# Patient Record
Sex: FEMALE | Race: WHITE | NOT HISPANIC OR LATINO | URBAN - METROPOLITAN AREA
[De-identification: names, ages, dates, MRNs, and addresses within clinical notes are randomized per-mention and may not be internally consistent; named-entity substitution may affect disease eponyms.]

---

## 2024-01-23 ENCOUNTER — LAB REQUISITION (OUTPATIENT)
Dept: LAB | Facility: HOSPITAL | Age: 84
End: 2024-01-23
Payer: COMMERCIAL

## 2024-01-23 DIAGNOSIS — Z79.01 LONG TERM (CURRENT) USE OF ANTICOAGULANTS: ICD-10-CM

## 2024-01-23 LAB
INR PPP: 3.3 (ref 0.9–1.1)
PROTHROMBIN TIME: 37.9 SECONDS (ref 9.8–12.8)

## 2024-01-23 PROCEDURE — 85610 PROTHROMBIN TIME: CPT

## 2024-01-30 ENCOUNTER — LAB REQUISITION (OUTPATIENT)
Dept: LAB | Facility: HOSPITAL | Age: 84
End: 2024-01-30
Payer: COMMERCIAL

## 2024-01-30 DIAGNOSIS — Z79.01 LONG TERM (CURRENT) USE OF ANTICOAGULANTS: ICD-10-CM

## 2024-01-30 LAB
INR PPP: 2.9 (ref 0.9–1.1)
PROTHROMBIN TIME: 32.6 SECONDS (ref 9.8–12.8)

## 2024-01-30 PROCEDURE — 85610 PROTHROMBIN TIME: CPT

## 2024-02-22 ENCOUNTER — LAB REQUISITION (OUTPATIENT)
Dept: LAB | Facility: HOSPITAL | Age: 84
End: 2024-02-22
Payer: COMMERCIAL

## 2024-02-22 DIAGNOSIS — Z79.01 LONG TERM (CURRENT) USE OF ANTICOAGULANTS: ICD-10-CM

## 2024-02-22 LAB
INR PPP: 3.2 (ref 0.9–1.1)
PROTHROMBIN TIME: 37.1 SECONDS (ref 9.8–12.8)

## 2024-02-22 PROCEDURE — 85610 PROTHROMBIN TIME: CPT

## 2024-02-27 ENCOUNTER — LAB REQUISITION (OUTPATIENT)
Dept: LAB | Facility: HOSPITAL | Age: 84
End: 2024-02-27
Payer: COMMERCIAL

## 2024-02-27 DIAGNOSIS — Z79.01 LONG TERM (CURRENT) USE OF ANTICOAGULANTS: ICD-10-CM

## 2024-02-27 LAB
INR PPP: 2.9 (ref 0.9–1.1)
PROTHROMBIN TIME: 33.1 SECONDS (ref 9.8–12.8)

## 2024-02-27 PROCEDURE — 85610 PROTHROMBIN TIME: CPT

## 2024-03-06 ENCOUNTER — LAB REQUISITION (OUTPATIENT)
Dept: LAB | Facility: HOSPITAL | Age: 84
End: 2024-03-06
Payer: COMMERCIAL

## 2024-03-06 DIAGNOSIS — Z79.01 LONG TERM (CURRENT) USE OF ANTICOAGULANTS: ICD-10-CM

## 2024-03-06 LAB
INR PPP: 4.5 (ref 0.9–1.1)
PROTHROMBIN TIME: 51.8 SECONDS (ref 9.8–12.8)

## 2024-03-06 PROCEDURE — 85610 PROTHROMBIN TIME: CPT

## 2024-03-12 ENCOUNTER — LAB REQUISITION (OUTPATIENT)
Dept: LAB | Facility: HOSPITAL | Age: 84
End: 2024-03-12
Payer: COMMERCIAL

## 2024-03-12 DIAGNOSIS — Z79.01 LONG TERM (CURRENT) USE OF ANTICOAGULANTS: ICD-10-CM

## 2024-03-12 LAB
INR PPP: 2.8 (ref 0.9–1.1)
PROTHROMBIN TIME: 31.8 SECONDS (ref 9.8–12.8)

## 2024-03-12 PROCEDURE — 85610 PROTHROMBIN TIME: CPT

## 2024-04-03 ENCOUNTER — LAB REQUISITION (OUTPATIENT)
Dept: LAB | Facility: HOSPITAL | Age: 84
End: 2024-04-03
Payer: COMMERCIAL

## 2024-04-03 DIAGNOSIS — Z79.01 LONG TERM (CURRENT) USE OF ANTICOAGULANTS: ICD-10-CM

## 2024-04-03 LAB
INR PPP: 2.9 (ref 0.9–1.1)
PROTHROMBIN TIME: 33 SECONDS (ref 9.8–12.8)

## 2024-04-03 PROCEDURE — 85610 PROTHROMBIN TIME: CPT

## 2024-05-21 ENCOUNTER — LAB REQUISITION (OUTPATIENT)
Dept: LAB | Facility: HOSPITAL | Age: 84
End: 2024-05-21
Payer: COMMERCIAL

## 2024-05-21 DIAGNOSIS — Z79.01 LONG TERM (CURRENT) USE OF ANTICOAGULANTS: ICD-10-CM

## 2024-05-21 LAB
INR PPP: 2.9 (ref 0.9–1.1)
PROTHROMBIN TIME: 32.5 SECONDS (ref 9.8–12.8)

## 2024-05-21 PROCEDURE — 85610 PROTHROMBIN TIME: CPT

## 2024-07-23 ENCOUNTER — HOSPITAL ENCOUNTER (EMERGENCY)
Facility: HOSPITAL | Age: 84
Discharge: HOME | End: 2024-07-23
Attending: STUDENT IN AN ORGANIZED HEALTH CARE EDUCATION/TRAINING PROGRAM
Payer: COMMERCIAL

## 2024-07-23 VITALS
HEART RATE: 78 BPM | BODY MASS INDEX: 28.28 KG/M2 | HEIGHT: 65 IN | RESPIRATION RATE: 16 BRPM | WEIGHT: 169.75 LBS | OXYGEN SATURATION: 94 % | SYSTOLIC BLOOD PRESSURE: 155 MMHG | DIASTOLIC BLOOD PRESSURE: 79 MMHG | TEMPERATURE: 99 F
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
## 2024-07-23 PROCEDURE — 99284 EMERGENCY DEPT VISIT MOD MDM: CPT | Performed by: EMERGENCY MEDICINE

## 2024-07-23 PROCEDURE — 99283 EMERGENCY DEPT VISIT LOW MDM: CPT

## 2024-07-23 ASSESSMENT — LIFESTYLE VARIABLES
EVER FELT BAD OR GUILTY ABOUT YOUR DRINKING: NO
EVER HAD A DRINK FIRST THING IN THE MORNING TO STEADY YOUR NERVES TO GET RID OF A HANGOVER: NO
TOTAL SCORE: 0
HAVE YOU EVER FELT YOU SHOULD CUT DOWN ON YOUR DRINKING: NO
HAVE PEOPLE ANNOYED YOU BY CRITICIZING YOUR DRINKING: NO

## 2024-07-23 ASSESSMENT — COLUMBIA-SUICIDE SEVERITY RATING SCALE - C-SSRS
2. HAVE YOU ACTUALLY HAD ANY THOUGHTS OF KILLING YOURSELF?: NO
6. HAVE YOU EVER DONE ANYTHING, STARTED TO DO ANYTHING, OR PREPARED TO DO ANYTHING TO END YOUR LIFE?: NO
1. IN THE PAST MONTH, HAVE YOU WISHED YOU WERE DEAD OR WISHED YOU COULD GO TO SLEEP AND NOT WAKE UP?: NO

## 2024-07-23 ASSESSMENT — PAIN - FUNCTIONAL ASSESSMENT: PAIN_FUNCTIONAL_ASSESSMENT: 0-10

## 2024-07-23 ASSESSMENT — PAIN SCALES - GENERAL: PAINLEVEL_OUTOF10: 0 - NO PAIN

## 2024-07-23 NOTE — ED PROVIDER NOTES
HPI   Chief Complaint   Patient presents with    Weakness, Gen     Pts legs became weak after walking with her walker and she fell on her buttocks, stating not very hard and no complaints of pain.       HPI    83-year-old female patient with a history of central retinal vein occlusion, femur fracture history, hypertension, diabetes, cataract arriving after she had a mechanical fall sitting onto her hips while walking with her walker and denies any acute pain.  She has no complaints.  Denies any dizziness or syncope prior to the fall.    Patient History   Past Medical History:   Diagnosis Date    Central retinal vein occlusion, left eye, stable (CMS-HCC) 03/23/2017    Central retinal vein occlusion of left eye    Central retinal vein occlusion, unspecified eye, stable (CMS-HCC) 05/12/2016    CRVO (central retinal vein occlusion)    Personal history of (healed) traumatic fracture 02/27/2018    History of fracture of femur    Personal history of other diseases of the circulatory system     History of hypertension    Personal history of other endocrine, nutritional and metabolic disease 02/23/2016    History of diabetes mellitus    Type 2 diabetes mellitus with mild nonproliferative 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 (Multi) 04/27/2017    Mild nonproliferative diabetic retinopathy of both eyes, without macular edema    Type 2 diabetes mellitus with mild nonproliferative 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, bilateral (Multi) 05/12/2016    Mild nonproliferative diabetic retinopathy of both eyes    Type 2 diabetes mellitus with mild nonproliferative 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, left eye (Multi) 05/12/2016    Non-proliferative diabetic retinopathy, left eye    Unequal limb length (acquired), unspecified site 08/09/2018    Acquired leg length discrepancy    Unequal limb length (acquired), unspecified site 08/09/2018    Acquired leg length discrepancy    Unspecified cataract 02/23/2016     Cataract of right eye    Unspecified cataract 02/23/2016    Cataract of left eye     Past Surgical History:   Procedure Laterality Date    OTHER SURGICAL HISTORY  02/23/2016    History Of Prior Surgery     No family history on file.  Social History     Tobacco Use    Smoking status: Not on file    Smokeless tobacco: Not on file   Substance Use Topics    Alcohol use: Not on file    Drug use: Not on file       Physical Exam   ED Triage Vitals [07/23/24 1602]   Temperature Heart Rate Respirations BP   37.2 °C (99 °F) 95 20 176/83      Pulse Ox Temp Source Heart Rate Source Patient Position   95 % Tympanic Monitor Sitting      BP Location FiO2 (%)     Right arm --       Physical Exam    General: Alert, no acute distress, well developed, Well hydrated  Head: NCAT  Eyes: Clear conjunctiva, EOMI  ENT: Moist mucosa, no lymphadenopathy  Respiratory: Normal respiratory effort. CTAB. Symmetric aeration. No wheezes, rales, or Rhonchi.  CV: Normal rhythm, Normal Rate, pulses present bilaterally  GI: Soft, NT, no RBT, no guarding, No distention  : no flank tenderness, no cva tenderness  MSK: Atraumatic. Full ROM in extremities. Bilateral symmetric intact strength. No pedal edema.  Skin: Warm, c/d/i  Neuro: AOx3, facial symmetry,   sensorimotor intact in extremities,   CN intact, Nonfocal neurological exam    ED Course & MDM   Diagnoses as of 07/29/24 2342   Accident due to mechanical fall without injury, initial encounter     Medical Decision Making      83-year-old female patient with a history of central retinal vein occlusion, femur fracture history, hypertension, diabetes, cataract arriving after she had a mechanical fall sitting onto her hips while walking with her walker and denies any acute pain.  She has no complaints.  Denies any dizziness or syncope prior to the fall.  She is feeling well.  She has normal mental status.  She is in no acute distress.  She denies any current headache, dizziness, vomiting, confusion.   She says that she does not use any oxygen in the daytime at home.  Vital signs are reassuring.  Patient seen and examined at bedside.  Patient is discharged in stable condition with return precautions and anticipatory guidance awaiting transport at this time.    External Records Reviewed: I reviewed recent and relevant outside records including: Prior  Independent Interpretation of Studies: I independently interpreted: As above  Social Determinants Affecting Care: Diagnostic testing considered: As above    I reviewed the case with the attending ER physician. Patient and/or patient´s representative was counseled regarding labs, imaging, likely diagnosis, and plan.     Patience Garcia MD MS  Emergency Medicine    The above documentation was completed with the use of speech recognition software. It may contain dictation errors secondary to limitations of the software.        Patience Garcia MD  Resident  07/24/24 0010    The patient was seen by the resident/fellow.  I have personally performed a substantive portion of the encounter.  I have seen and examined the patient; agree with the workup, evaluation, MDM, management and diagnosis.  The care plan has been discussed with the resident; I have reviewed the resident’s note and agree with the documented findings.       Ang Castro, DO  07/29/24 [id number]

## 2024-07-23 NOTE — DISCHARGE INSTRUCTIONS
Please return to the emergency room for any worsening headache, dizziness, vomiting or confusion as this can be signs of delayed brain bleed.  Otherwise follow-up with the provider at the nursing facility as directed.

## 2024-10-23 ENCOUNTER — HOSPITAL ENCOUNTER (INPATIENT)
Facility: HOSPITAL | Age: 84
Discharge: HOME | DRG: 193 | End: 2024-10-23
Attending: STUDENT IN AN ORGANIZED HEALTH CARE EDUCATION/TRAINING PROGRAM | Admitting: STUDENT IN AN ORGANIZED HEALTH CARE EDUCATION/TRAINING PROGRAM
Payer: MEDICARE

## 2024-10-23 ENCOUNTER — APPOINTMENT (OUTPATIENT)
Dept: RADIOLOGY | Facility: HOSPITAL | Age: 84
DRG: 193 | End: 2024-10-23
Payer: MEDICARE

## 2024-10-23 DIAGNOSIS — J18.9 PNEUMONIA OF BOTH LUNGS DUE TO INFECTIOUS ORGANISM, UNSPECIFIED PART OF LUNG: Primary | ICD-10-CM

## 2024-10-23 DIAGNOSIS — R79.1 ABNORMAL INR: ICD-10-CM

## 2024-10-23 DIAGNOSIS — R53.1 WEAKNESS: ICD-10-CM

## 2024-10-23 LAB
ALBUMIN SERPL BCP-MCNC: 3.7 G/DL (ref 3.4–5)
ALP SERPL-CCNC: 52 U/L (ref 33–136)
ALT SERPL W P-5'-P-CCNC: 7 U/L (ref 7–45)
ANION GAP SERPL CALC-SCNC: 14 MMOL/L
AST SERPL W P-5'-P-CCNC: 9 U/L (ref 9–39)
BASOPHILS # BLD AUTO: 0.04 X10*3/UL (ref 0–0.1)
BASOPHILS NFR BLD AUTO: 0.3 %
BILIRUB SERPL-MCNC: 0.8 MG/DL (ref 0–1.2)
BNP SERPL-MCNC: 208 PG/ML (ref 0–99)
BUN SERPL-MCNC: 17 MG/DL (ref 6–23)
CALCIUM SERPL-MCNC: 9.2 MG/DL (ref 8.6–10.3)
CARDIAC TROPONIN I PNL SERPL HS: 11 NG/L (ref 0–13)
CARDIAC TROPONIN I PNL SERPL HS: 11 NG/L (ref 0–13)
CHLORIDE SERPL-SCNC: 100 MMOL/L (ref 98–107)
CO2 SERPL-SCNC: 29 MMOL/L (ref 21–32)
CREAT SERPL-MCNC: 0.8 MG/DL (ref 0.5–1.05)
EGFRCR SERPLBLD CKD-EPI 2021: 72 ML/MIN/1.73M*2
EOSINOPHIL # BLD AUTO: 0.12 X10*3/UL (ref 0–0.4)
EOSINOPHIL NFR BLD AUTO: 0.9 %
ERYTHROCYTE [DISTWIDTH] IN BLOOD BY AUTOMATED COUNT: 14.9 % (ref 11.5–14.5)
FLUAV RNA RESP QL NAA+PROBE: NOT DETECTED
FLUBV RNA RESP QL NAA+PROBE: NOT DETECTED
GLUCOSE SERPL-MCNC: 125 MG/DL (ref 74–99)
HCT VFR BLD AUTO: 42.9 % (ref 36–46)
HGB BLD-MCNC: 13.6 G/DL (ref 12–16)
HOLD SPECIMEN: NORMAL
IMM GRANULOCYTES # BLD AUTO: 0.04 X10*3/UL (ref 0–0.5)
IMM GRANULOCYTES NFR BLD AUTO: 0.3 % (ref 0–0.9)
LYMPHOCYTES # BLD AUTO: 1 X10*3/UL (ref 0.8–3)
LYMPHOCYTES NFR BLD AUTO: 7.6 %
MCH RBC QN AUTO: 26.5 PG (ref 26–34)
MCHC RBC AUTO-ENTMCNC: 31.7 G/DL (ref 32–36)
MCV RBC AUTO: 84 FL (ref 80–100)
MONOCYTES # BLD AUTO: 1.11 X10*3/UL (ref 0.05–0.8)
MONOCYTES NFR BLD AUTO: 8.5 %
NEUTROPHILS # BLD AUTO: 10.79 X10*3/UL (ref 1.6–5.5)
NEUTROPHILS NFR BLD AUTO: 82.4 %
NRBC BLD-RTO: 0 /100 WBCS (ref 0–0)
PLATELET # BLD AUTO: 194 X10*3/UL (ref 150–450)
POTASSIUM SERPL-SCNC: 3.8 MMOL/L (ref 3.5–5.3)
PROT SERPL-MCNC: 6.5 G/DL (ref 6.4–8.2)
RBC # BLD AUTO: 5.13 X10*6/UL (ref 4–5.2)
SARS-COV-2 RNA RESP QL NAA+PROBE: NOT DETECTED
SODIUM SERPL-SCNC: 139 MMOL/L (ref 136–145)
WBC # BLD AUTO: 13.1 X10*3/UL (ref 4.4–11.3)

## 2024-10-23 PROCEDURE — 2500000001 HC RX 250 WO HCPCS SELF ADMINISTERED DRUGS (ALT 637 FOR MEDICARE OP): Performed by: STUDENT IN AN ORGANIZED HEALTH CARE EDUCATION/TRAINING PROGRAM

## 2024-10-23 PROCEDURE — 84484 ASSAY OF TROPONIN QUANT: CPT | Performed by: STUDENT IN AN ORGANIZED HEALTH CARE EDUCATION/TRAINING PROGRAM

## 2024-10-23 PROCEDURE — 36415 COLL VENOUS BLD VENIPUNCTURE: CPT | Performed by: STUDENT IN AN ORGANIZED HEALTH CARE EDUCATION/TRAINING PROGRAM

## 2024-10-23 PROCEDURE — 85025 COMPLETE CBC W/AUTO DIFF WBC: CPT | Performed by: STUDENT IN AN ORGANIZED HEALTH CARE EDUCATION/TRAINING PROGRAM

## 2024-10-23 PROCEDURE — 2500000004 HC RX 250 GENERAL PHARMACY W/ HCPCS (ALT 636 FOR OP/ED): Performed by: STUDENT IN AN ORGANIZED HEALTH CARE EDUCATION/TRAINING PROGRAM

## 2024-10-23 PROCEDURE — 87636 SARSCOV2 & INF A&B AMP PRB: CPT | Performed by: STUDENT IN AN ORGANIZED HEALTH CARE EDUCATION/TRAINING PROGRAM

## 2024-10-23 PROCEDURE — 85025 COMPLETE CBC W/AUTO DIFF WBC: CPT | Performed by: EMERGENCY MEDICINE

## 2024-10-23 PROCEDURE — 71275 CT ANGIOGRAPHY CHEST: CPT

## 2024-10-23 PROCEDURE — 84484 ASSAY OF TROPONIN QUANT: CPT | Performed by: EMERGENCY MEDICINE

## 2024-10-23 PROCEDURE — 80053 COMPREHEN METABOLIC PANEL: CPT | Performed by: STUDENT IN AN ORGANIZED HEALTH CARE EDUCATION/TRAINING PROGRAM

## 2024-10-23 PROCEDURE — 2550000001 HC RX 255 CONTRASTS: Performed by: STUDENT IN AN ORGANIZED HEALTH CARE EDUCATION/TRAINING PROGRAM

## 2024-10-23 PROCEDURE — 83880 ASSAY OF NATRIURETIC PEPTIDE: CPT | Performed by: STUDENT IN AN ORGANIZED HEALTH CARE EDUCATION/TRAINING PROGRAM

## 2024-10-23 PROCEDURE — 93010 ELECTROCARDIOGRAM REPORT: CPT | Performed by: INTERNAL MEDICINE

## 2024-10-23 PROCEDURE — 83880 ASSAY OF NATRIURETIC PEPTIDE: CPT | Performed by: EMERGENCY MEDICINE

## 2024-10-23 PROCEDURE — 99285 EMERGENCY DEPT VISIT HI MDM: CPT | Performed by: STUDENT IN AN ORGANIZED HEALTH CARE EDUCATION/TRAINING PROGRAM

## 2024-10-23 PROCEDURE — 96367 TX/PROPH/DG ADDL SEQ IV INF: CPT

## 2024-10-23 PROCEDURE — 93010 ELECTROCARDIOGRAM REPORT: CPT | Performed by: STUDENT IN AN ORGANIZED HEALTH CARE EDUCATION/TRAINING PROGRAM

## 2024-10-23 PROCEDURE — 2500000002 HC RX 250 W HCPCS SELF ADMINISTERED DRUGS (ALT 637 FOR MEDICARE OP, ALT 636 FOR OP/ED): Performed by: STUDENT IN AN ORGANIZED HEALTH CARE EDUCATION/TRAINING PROGRAM

## 2024-10-23 PROCEDURE — 96365 THER/PROPH/DIAG IV INF INIT: CPT

## 2024-10-23 PROCEDURE — 80053 COMPREHEN METABOLIC PANEL: CPT | Performed by: EMERGENCY MEDICINE

## 2024-10-23 PROCEDURE — 71045 X-RAY EXAM CHEST 1 VIEW: CPT | Performed by: RADIOLOGY

## 2024-10-23 PROCEDURE — 99285 EMERGENCY DEPT VISIT HI MDM: CPT | Mod: 25

## 2024-10-23 PROCEDURE — 94640 AIRWAY INHALATION TREATMENT: CPT

## 2024-10-23 PROCEDURE — 71045 X-RAY EXAM CHEST 1 VIEW: CPT

## 2024-10-23 PROCEDURE — 71275 CT ANGIOGRAPHY CHEST: CPT | Mod: FOREIGN READ | Performed by: RADIOLOGY

## 2024-10-23 PROCEDURE — 36415 COLL VENOUS BLD VENIPUNCTURE: CPT | Performed by: EMERGENCY MEDICINE

## 2024-10-23 RX ORDER — MAGNESIUM SULFATE HEPTAHYDRATE 40 MG/ML
2 INJECTION, SOLUTION INTRAVENOUS ONCE
Status: COMPLETED | OUTPATIENT
Start: 2024-10-23 | End: 2024-10-23

## 2024-10-23 RX ORDER — AZITHROMYCIN 500 MG/1
500 TABLET, FILM COATED ORAL ONCE
Status: COMPLETED | OUTPATIENT
Start: 2024-10-23 | End: 2024-10-23

## 2024-10-23 RX ORDER — CEFTRIAXONE 1 G/50ML
1 INJECTION, SOLUTION INTRAVENOUS ONCE
Status: COMPLETED | OUTPATIENT
Start: 2024-10-23 | End: 2024-10-23

## 2024-10-23 RX ORDER — DEXAMETHASONE 6 MG/1
12 TABLET ORAL ONCE
Status: COMPLETED | OUTPATIENT
Start: 2024-10-23 | End: 2024-10-23

## 2024-10-23 RX ORDER — IPRATROPIUM BROMIDE AND ALBUTEROL SULFATE 2.5; .5 MG/3ML; MG/3ML
3 SOLUTION RESPIRATORY (INHALATION) EVERY 20 MIN
Status: COMPLETED | OUTPATIENT
Start: 2024-10-23 | End: 2024-10-23

## 2024-10-23 ASSESSMENT — PAIN - FUNCTIONAL ASSESSMENT: PAIN_FUNCTIONAL_ASSESSMENT: 0-10

## 2024-10-23 ASSESSMENT — PAIN SCALES - GENERAL
PAINLEVEL_OUTOF10: 0 - NO PAIN
PAINLEVEL_OUTOF10: 0 - NO PAIN

## 2024-10-23 NOTE — ED PROVIDER NOTES
EMERGENCY DEPARTMENT ENCOUNTER      Pt Name: Joana Morales  MRN: [id number]  Birthdate 1940  Date of evaluation: 10/23/2024  Provider: Castillo Andre MD    CHIEF COMPLAINT       Chief Complaint   Patient presents with    Cough     Patient stated that she has had a non-productive cough for three days     HISTORY OF PRESENT ILLNESS    Joana Morales is a 84 y.o. year old female who presents to the ER for 3 day history of cough.  Patient comes to the ED from her assisted living facility.  Patient denies fevers, flulike symptoms, abdominal pain, chest pain, changes in bowel or bladder habits.  Patient reports that she is normally on 2 L of oxygen in her nursing facility but here in the ED required 4 L of oxygen.  The patient does not usually have a Lawrence catheter.      Past medical history significant for COPD, diabetes, diabetic retinopathy, atrial fibrillation.      PAST MEDICAL HISTORY     Past Medical History:   Diagnosis Date    Central retinal vein occlusion, left eye, stable (CMS-HCC) 03/23/2017    Central retinal vein occlusion of left eye    Central retinal vein occlusion, unspecified eye, stable (CMS-HCC) 05/12/2016    CRVO (central retinal vein occlusion)    Personal history of (healed) traumatic fracture 02/27/2018    History of fracture of femur    Personal history of other diseases of the circulatory system     History of hypertension    Personal history of other endocrine, nutritional and metabolic disease 02/23/2016    History of diabetes mellitus    Type 2 diabetes mellitus with mild nonproliferative 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 04/27/2017    Mild nonproliferative diabetic retinopathy of both eyes, without macular edema    Type 2 diabetes mellitus with mild nonproliferative 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, bilateral 05/12/2016    Mild nonproliferative diabetic retinopathy of both eyes    Type 2 diabetes mellitus with mild nonproliferative diabetic retinopathy without macular  edema, left eye 05/12/2016    Non-proliferative diabetic retinopathy, left eye    Unequal limb length (acquired), unspecified site 08/09/2018    Acquired leg length discrepancy    Unequal limb length (acquired), unspecified site 08/09/2018    Acquired leg length discrepancy    Unspecified cataract 02/23/2016    Cataract of right eye    Unspecified cataract 02/23/2016    Cataract of left eye     CURRENT MEDICATIONS       Current Discharge Medication List        CONTINUE these medications which have NOT CHANGED    Details   alendronate (Fosamax) 70 mg tablet Take 1 tablet (70 mg) by mouth every 7 days.      amLODIPine (Norvasc) 10 mg tablet Take 1 tablet (10 mg) by mouth once daily.      buPROPion SR (Wellbutrin SR) 100 mg 12 hr tablet Take 1 tablet (100 mg) by mouth 2 times a day.      citalopram (CeleXA) 20 mg tablet Take 1 tablet (20 mg) by mouth once daily.      coenzyme Q-10 100 mg capsule Take 1 capsule (100 mg) by mouth once daily.      digoxin (Lanoxin) 125 MCG tablet Take 1 tablet (125 mcg) by mouth once daily.      docusate sodium (Colace) 100 mg capsule Take 1 capsule (100 mg) by mouth 2 times a day.      furosemide (Lasix) 80 mg tablet Take 1 tablet (80 mg) by mouth once daily.      glipiZIDE (Glucotrol) 5 mg tablet Take 1 tablet (5 mg) by mouth once daily in the morning.      Januvia 50 mg tablet Take 1 tablet (50 mg) by mouth once daily.      losartan (Cozaar) 25 mg tablet Take 1 tablet (25 mg) by mouth once daily.      magnesium oxide (Mag-Ox) 400 mg (241.3 mg magnesium) tablet Take 1 tablet (400 mg) by mouth once daily.      melatonin 5 mg tablet Take 2 tablets (10 mg) by mouth once daily at bedtime.      metFORMIN (Glucophage) 500 mg tablet Take 1 tablet (500 mg) by mouth 3 times a day.      metoprolol succinate XL (Toprol-XL) 100 mg 24 hr tablet Take 1 tablet (100 mg) by mouth 2 times a day.      omeprazole (PriLOSEC) 40 mg DR capsule Take 1 capsule (40 mg) by mouth once daily.      potassium  chloride CR 20 mEq ER tablet Take 2 tablets (40 mEq) by mouth once daily.      simvastatin (Zocor) 20 mg tablet Take 1 tablet (20 mg) by mouth once daily at bedtime.      traZODone (Desyrel) 50 mg tablet Take 1 tablet (50 mg) by mouth once daily at bedtime.      !! warfarin (Coumadin) 4 mg tablet Take 1 tablet (4 mg) by mouth once a day on Monday, Wednesday, and Friday.      !! warfarin (Coumadin) 7.5 mg tablet Take 1 tablet (7.5 mg) by mouth once a day on Sunday, Tuesday, Thursday, and Saturday.      ferrous sulfate, 325 mg ferrous sulfate, tablet Take 1 tablet (325 mg) by mouth 2 times a day.      HumaLOG KwikPen Insulin 100 unit/mL injection Inject 7 Units under the skin 4 times a day.      ibuprofen 600 mg tablet Take 1 tablet (600 mg) by mouth every 6 hours if needed for mild pain (1 - 3).      insulin glargine (Basaglar KwikPen U-100 Insulin) 100 unit/mL (3 mL) pen Inject 50 Units under the skin once daily at bedtime. Take as directed per insulin instructions.       !! - Potential duplicate medications found. Please discuss with provider.        SURGICAL HISTORY       Past Surgical History:   Procedure Laterality Date    OTHER SURGICAL HISTORY  02/23/2016    History Of Prior Surgery     ALLERGIES     Patient has no known allergies.  FAMILY HISTORY     No family history on file.  SOCIAL HISTORY       Social History     Tobacco Use    Smoking status: Never    Smokeless tobacco: Never   Vaping Use    Vaping status: Never Used   Substance Use Topics    Alcohol use: Defer    Drug use: Never     PHYSICAL EXAM  (up to 7 for level 4, 8 or more for level 5)     ED Triage Vitals [10/23/24 1358]   Temperature Heart Rate Respirations BP   36.9 °C (98.4 °F) 78 20 167/70      Pulse Ox Temp Source Heart Rate Source Patient Position   96 % Temporal Monitor Sitting      BP Location FiO2 (%)     Right arm --       Physical Exam  Constitutional:       Appearance: She is obese. She is not diaphoretic.   HENT:      Head:  Normocephalic.      Nose: Nose normal.      Mouth/Throat:      Mouth: Mucous membranes are moist.   Eyes:      Extraocular Movements: Extraocular movements intact.      Conjunctiva/sclera: Conjunctivae normal.   Cardiovascular:      Rate and Rhythm: Normal rate and regular rhythm.   Pulmonary:      Breath sounds: Decreased air movement present. Rhonchi (Mild in the bilateral bases) present. No wheezing or rales.   Musculoskeletal:      Cervical back: Normal range of motion and neck supple.   Neurological:      Mental Status: She is alert.        DIAGNOSTIC RESULTS   LABS:  Labs Reviewed   CBC WITH AUTO DIFFERENTIAL - Abnormal       Result Value    WBC 13.1 (*)     nRBC 0.0      RBC 5.13      Hemoglobin 13.6      Hematocrit 42.9      MCV 84      MCH 26.5      MCHC 31.7 (*)     RDW 14.9 (*)     Platelets 194      Neutrophils % 82.4      Immature Granulocytes %, Automated 0.3      Lymphocytes % 7.6      Monocytes % 8.5      Eosinophils % 0.9      Basophils % 0.3      Neutrophils Absolute 10.79 (*)     Immature Granulocytes Absolute, Automated 0.04      Lymphocytes Absolute 1.00      Monocytes Absolute 1.11 (*)     Eosinophils Absolute 0.12      Basophils Absolute 0.04     COMPREHENSIVE METABOLIC PANEL - Abnormal    Glucose 125 (*)     Sodium 139      Potassium 3.8      Chloride 100      Bicarbonate 29      Anion Gap 14      Urea Nitrogen 17      Creatinine 0.80      eGFR 72      Calcium 9.2      Albumin 3.7      Alkaline Phosphatase 52      Total Protein 6.5      AST 9      Bilirubin, Total 0.8      ALT 7     B-TYPE NATRIURETIC PEPTIDE - Abnormal     (*)     Narrative:        <100 pg/mL - Heart failure unlikely  100-299 pg/mL - Intermediate probability of acute heart                  failure exacerbation. Correlate with clinical                  context and patient history.    >=300 pg/mL - Heart Failure likely. Correlate with clinical                  context and patient history.    BNP testing is performed  using different testing methodology at [facility] than at other [facility]. Direct result comparisons should only be made within the same method.      CBC - Abnormal    WBC 10.7      nRBC 0.0      RBC 4.90      Hemoglobin 12.8      Hematocrit 41.9      MCV 86      MCH 26.1      MCHC 30.5 (*)     RDW 14.6 (*)     Platelets 193     RENAL FUNCTION PANEL - Abnormal    Glucose 475 (*)     Sodium 132 (*)     Potassium 4.6      Chloride 95 (*)     Bicarbonate 24      Anion Gap 18      Urea Nitrogen 23      Creatinine 0.79      eGFR 73      Calcium 8.6      Phosphorus 3.7      Albumin 3.4     PROCALCITONIN - Abnormal    Procalcitonin 0.10 (*)     Narrative:     Procalcitonin (PCT) results measured serially can  aid in decision-making for antibiotic discontinuation in  patients with suspected or confirmed sepsis in conjunction  with additional clinical information. Antibiotic  discontinuation may be considered with a change in PCT of  >80% from the peak result or when PCT falls below 0.50 ng/mL.    Procalcitonin results should not be used in isolation but  should be interpreted in conjunction with additional clinical  and laboratory findings. Procalcitonin results should not be  used to guide the initiation of antibiotic therapy.    Falsely low PCT values in the presence of bacterial infection  may occur in early infection, with atypical pathogens,  localized infections, and subacute infectious endocarditis.    Falsely elevated results outside of severe bacterial  infection/sepsis may be seen in patients with renal failure  or insufficiency, severe trauma or burns, recent major  abdominal/cardiac surgery, acute multi-organ failure, rarely  in patients with medullary thyroid carcinoma and rare  neuroendocrine tumors, and non-specific interfering antibodies  (heterophile antibodies, rheumatoid factor, human anti-mouse  antibodies (HAMA), etc).    Performance of the PCT test in pediatric patients  (<17yo),  pregnant women, immunocompromised patients, and patients on  immunomodulatory medications has not been evaluated.   PROTIME-INR - Abnormal    Protime 32.7 (*)     INR 2.9 (*)    CBC - Abnormal    WBC 11.6 (*)     nRBC 0.0      RBC 4.90      Hemoglobin 13.1      Hematocrit 41.7      MCV 85      MCH 26.7      MCHC 31.4 (*)     RDW 14.6 (*)     Platelets 216     RENAL FUNCTION PANEL - Abnormal    Glucose 363 (*)     Sodium 132 (*)     Potassium 4.0      Chloride 97 (*)     Bicarbonate 27      Anion Gap 12      Urea Nitrogen 34 (*)     Creatinine 0.76      eGFR 76      Calcium 8.6      Phosphorus 3.0      Albumin 3.2 (*)    PROTIME-INR - Abnormal    Protime 55.4 (*)     INR 4.8 (*)    POCT GLUCOSE - Abnormal    POCT Glucose 365 (*)    POCT GLUCOSE - Abnormal    POCT Glucose 461 (*)    POCT GLUCOSE - Abnormal    POCT Glucose 480 (*)    POCT GLUCOSE - Abnormal    POCT Glucose 439 (*)    POCT GLUCOSE - Abnormal    POCT Glucose 397 (*)    POCT GLUCOSE - Abnormal    POCT Glucose 423 (*)    POCT GLUCOSE - Abnormal    POCT Glucose 432 (*)    POCT GLUCOSE - Abnormal    POCT Glucose 366 (*)    POCT GLUCOSE - Abnormal    POCT Glucose 329 (*)    POCT GLUCOSE - Abnormal    POCT Glucose 373 (*)    SERIAL TROPONIN-INITIAL - Normal    Troponin I, High Sensitivity 11      Narrative:     Less than 99th percentile of normal range cutoff-  Female and children under 18 years old <14 ng/L; Male <21 ng/L: Negative  Repeat testing should be performed if clinically indicated.     Female and children under 18 years old 14-50 ng/L; Male 21-50 ng/L:  Consistent with possible cardiac damage and possible increased clinical   risk. Serial measurements may help to assess extent of myocardial damage.     >50 ng/L: Consistent with cardiac damage, increased clinical risk and  myocardial infarction. Serial measurements may help assess extent of   myocardial damage.      NOTE: Children less than 1 year old may have higher baseline troponin    levels and results should be interpreted in conjunction with the overall   clinical context.     NOTE: Troponin I testing is performed using a different   testing methodology at [facility] than at other   [facility]. Direct result comparisons should only   be made within the same method.   SERIAL TROPONIN, 1 HOUR - Normal    Troponin I, High Sensitivity 11      Narrative:     Less than 99th percentile of normal range cutoff-  Female and children under 18 years old <14 ng/L; Male <21 ng/L: Negative  Repeat testing should be performed if clinically indicated.     Female and children under 18 years old 14-50 ng/L; Male 21-50 ng/L:  Consistent with possible cardiac damage and possible increased clinical   risk. Serial measurements may help to assess extent of myocardial damage.     >50 ng/L: Consistent with cardiac damage, increased clinical risk and  myocardial infarction. Serial measurements may help assess extent of   myocardial damage.      NOTE: Children less than 1 year old may have higher baseline troponin   levels and results should be interpreted in conjunction with the overall   clinical context.     NOTE: Troponin I testing is performed using a different   testing methodology at [facility] than at other   [facility]. Direct result comparisons should only   be made within the same method.   INFLUENZA A AND B PCR - Normal    Flu A Result Not Detected      Flu B Result Not Detected      Narrative:     This assay is an in vitro diagnostic multiplex nucleic acid amplification test for the detection and discrimination of Influenza A & B from nasopharyngeal specimens, and has been validated for use at [facility]. Negative results do not preclude Influenza A/B infections, and should not be used as the sole basis for diagnosis, treatment, or other management decisions. If Influenza A/B and RSV PCR results are negative, testing for Parainfluenza virus,  Adenovirus and Metapneumovirus is routinely performed for [facility] pediatric oncology and intensive care inpatients, and is available on other patients by placing an add-on request.   SARS-COV-2 PCR - Normal    Coronavirus 2019, PCR Not Detected      Narrative:     This assay has received FDA Emergency Use Authorization (EUA) and is only authorized for the duration of time that circumstances exist to justify the authorization of the emergency use of in vitro diagnostic tests for the detection of SARS-CoV-2 virus and/or diagnosis of COVID-19 infection under section 564(b)(1) of the Act, 21 U.S.C. 360bbb-3(b)(1). This assay is an in vitro diagnostic nucleic acid amplification test for the qualitative detection of SARS-CoV-2 from nasopharyngeal specimens and has been validated for use at [facility]. Negative results do not preclude COVID-19 infections and should not be used as the sole basis for diagnosis, treatment, or other management decisions.     MAGNESIUM - Normal    Magnesium 2.08     MAGNESIUM - Normal    Magnesium 2.08     LEGIONELLA ANTIGEN, URINE   STREPTOCOCCUS PNEUMONIAE ANTIGEN, URINE   TROPONIN SERIES- (INITIAL, 1 HR)    Narrative:     The following orders were created for panel order Troponin I Series, High Sensitivity (0, 1 HR).  Procedure                               Abnormality         Status                     ---------                               -----------         ------                     Troponin I, High Sensiti...[[id number]]  Normal              Final result               Troponin, High Sensitivi...[[id number]]  Normal              Final result                 Please view results for these tests on the individual orders.   POCT GLUCOSE METER   POCT GLUCOSE METER   POCT GLUCOSE METER   POCT GLUCOSE METER   POCT GLUCOSE METER   POCT GLUCOSE METER   POCT GLUCOSE METER   POCT GLUCOSE METER   POCT GLUCOSE METER   POCT GLUCOSE METER   POCT GLUCOSE METER   POCT GLUCOSE METER    POCT GLUCOSE METER   POCT GLUCOSE METER   POCT GLUCOSE METER     All other labs were within normal range or not returned as of this dictation.  Imaging  CT angio chest for pulmonary embolism   Final Result   1. No acute or chronic pulmonary emboli.   2. No thoracic aortic aneurysm or dissection.   3. Multifocal pneumonia involving the posterior right upper lobe and   posterior left lower lung.  Scattered areas of atelectasis   demonstrated at both posterior lung bases.   4. Multilevel degenerative changes of the thoracic spine with disc   desiccation from T8-9 through T11-12 inclusive.   5. 3 mm sclerotic bone lesion identified within the T8 vertebral body.   Signed by Rohit Gong MD      XR chest 1 view   Final Result   No focal infiltrate or pneumothorax is identified.        MACRO:   None.        Signed by: Meet Alaniz 10/23/2024 2:36 PM   Dictation workstation:   [id number]         Procedure  Procedures  EMERGENCY DEPARTMENT COURSE/MDM:   Medical Decision Making  Patient is a 84-year-old female who comes to the ED for history of cough.  Differential diagnoses include pneumonia, PE, URI, MI.    On physical exam there is decreased breath sounds in the lungs, patient has a cough that is nonproductive.  Swabs for COVID and flu were negative.  CMP was within normal limits.  Troponins normal limits.  .  CBC had a WBC of 13.1.  Chest x-ray negative for infiltrates or pneumothorax.  CT angio of the chest showed multifocal pneumonia in the posterior right upper lobe and posterior left lower lobe.  There was no evidence of acute or chronic pulmonary emboli.    Internal medicine was consulted and agreed to admit the patient for observation and IV antibiotics for treatment of pneumonia with new oxygen requirement.      Vitals:    Vitals:    10/24/24 1600 10/24/24 2000 10/25/24 0815 10/25/24 0817   BP: 139/70 136/71  178/76   BP Location: Left arm Left arm     Patient Position:  Lying     Pulse: 75 76  65    Resp:  18  18   Temp: 36 °C (96.8 °F) 35.3 °C (95.5 °F)  35.3 °C (95.5 °F)   TempSrc: Temporal   Temporal   SpO2: 94% 94% 96% 96%   Weight:       Height:             ED Course as of 10/25/24 0856   Wed Oct 23, 2024   2316 Multifocal pneumonia involving the posterior right upper lobe and posterior left lower lung on CTA of chest. Started Pt on Rocephin and Zithromax   [GV]      ED Course User Index  [GV] Castillo Andre MD         Diagnoses as of 10/25/24 0856   Pneumonia of both lungs due to infectious organism, unspecified part of lung       Consultant discussions: Internal medicine was consulted and agreed to admit the patient for observation and IV antibiotics.    External Records Reviewed: I reviewed recent and relevant outside records including inpatient notes, outpatient records      Shared decision making for disposition  Patient and/or patient´s representative was counseled regarding labs, imaging, likely diagnosis. All questions were answered. Recommendation was made   for Admission given the need for further escalation of care to inpatient management. Patient agreed and was admitted in stable condition. Admitting team was notified of any pending labs or imaging to ensure continuity of care.     ED Medications administered this visit:    Medications   polyethylene glycol (Glycolax, Miralax) packet 17 g (17 g oral Not Given 10/24/24 0903)   ipratropium-albuteroL (Duo-Neb) 0.5-2.5 mg/3 mL nebulizer solution 3 mL (has no administration in time range)   cefTRIAXone (Rocephin) 2 g in dextrose (iso) IV 50 mL (0 g intravenous Stopped 10/24/24 2239)   benzonatate (Tessalon) capsule 100 mg (has no administration in time range)   azithromycin (Zithromax) tablet 500 mg (500 mg oral Given 10/24/24 2146)   warfarin (Coumadin) tablet 4 mg ( oral Dose Auto Held 10/30/24 1800)   warfarin (Coumadin) tablet 7.5 mg ( oral Dose Auto Held 10/31/24 1800)   traZODone (Desyrel) tablet 50 mg (50 mg oral Given 10/24/24 2146)    simvastatin (Zocor) tablet 20 mg (20 mg oral Given 10/24/24 2146)   pantoprazole (ProtoNix) EC tablet 40 mg (40 mg oral Given 10/25/24 0620)   metoprolol succinate XL (Toprol-XL) 24 hr tablet 100 mg (100 mg oral Given 10/24/24 2146)   melatonin tablet 10 mg (10 mg oral Given 10/24/24 2146)   magnesium oxide (Mag-Ox) tablet 400 mg (400 mg oral Given 10/24/24 0905)   losartan (Cozaar) tablet 25 mg (25 mg oral Given 10/24/24 0909)   amLODIPine (Norvasc) tablet 10 mg (10 mg oral Given 10/24/24 0912)   insulin lispro injection 0-10 Units (10 Units subcutaneous Given 10/24/24 2135)   buPROPion SR (Wellbutrin SR) 12 hr tablet 100 mg (100 mg oral Given 10/24/24 2146)   citalopram (CeleXA) tablet 20 mg (20 mg oral Given 10/24/24 0903)   digoxin (Lanoxin) tablet 125 mcg (125 mcg oral Given 10/24/24 0908)   glucagon (Glucagen) injection 1 mg (has no administration in time range)   dextrose 50 % injection 25 g (has no administration in time range)   glucagon (Glucagen) injection 1 mg (has no administration in time range)   dextrose 50 % injection 12.5 g (has no administration in time range)   insulin lispro injection 7 Units (7 Units subcutaneous Given 10/24/24 1652)   insulin glargine (Lantus) injection 35 Units (35 Units subcutaneous Given 10/24/24 1027)   ipratropium-albuteroL (Duo-Neb) 0.5-2.5 mg/3 mL nebulizer solution 3 mL (3 mL nebulization Given 10/25/24 0815)   oxygen (O2) therapy (3 L/min inhalation Rate Verify Medical Gas 10/25/24 0815)   ipratropium-albuteroL (Duo-Neb) 0.5-2.5 mg/3 mL nebulizer solution 3 mL (3 mL nebulization Given 10/23/24 1828)   dexAMETHasone (Decadron) tablet 12 mg (12 mg oral Given 10/23/24 1837)   magnesium sulfate 2 g in sterile water for injection 50 mL (0 g intravenous Stopped 10/23/24 1909)   iohexol (OMNIPaque) 350 mg iodine/mL solution 60 mL (60 mL intravenous Given 10/23/24 1930)   cefTRIAXone (Rocephin) 1 g in dextrose (iso) IV 50 mL (0 g intravenous Stopped 10/23/24 2347)    azithromycin (Zithromax) tablet 500 mg (500 mg oral Given 10/23/24 2229)   insulin lispro injection 10 Units (10 Units subcutaneous Given 10/24/24 0848)   QUEtiapine (SEROquel) tablet 25 mg (25 mg oral Given 10/24/24 2146)   insulin lispro injection 8 Units (8 Units subcutaneous Given 10/24/24 1030)   insulin lispro injection 10 Units (10 Units subcutaneous Given 10/24/24 1654)       New Prescriptions from this visit:    Current Discharge Medication List          Follow-up:  No follow-up provider specified.      Final Impression:   1. Pneumonia of both lungs due to infectious organism, unspecified part of lung          Please excuse any misspellings or unintended errors related to the Dragon speech recognition software used to dictate this note.    I reviewed the case with the attending ED physician. The attending ED physician agrees with the plan.      Castillo Andre MD  Resident  10/25/24 0857       Sukhdev Brewster MD  10/26/24 1627    Emergency Medicine Attending Attestation:     ED Course as of 10/26/24 1627   Wed Oct 23, 2024   2316 Multifocal pneumonia involving the posterior right upper lobe and posterior left lower lung on CTA of chest. Started Pt on Rocephin and Zithromax   [GV]      ED Course User Index  [GV] Castillo Andre MD         Diagnoses as of 10/26/24 1627   Pneumonia of both lungs due to infectious organism, unspecified part of lung       The patient was seen by the resident/fellow.  I have personally performed a substantive portion of the encounter.  I have seen and examined the patient; agree with the workup, evaluation, MDM, management and diagnosis.  The care plan has been discussed with the resident; I have reviewed the resident’s note and agree with the documented findings.      EKG taken at 1451 on 23 October 2024 showing rate controlled A-fib with a rate of 71, normal axis, otherwise normal intervals, no acute ST elevation or depression    I independently interpreted patient's EKG  and agree with the above mentioned interpretation.      MD Sukhdev Murray MD  10/26/24 1176

## 2024-10-24 ENCOUNTER — APPOINTMENT (OUTPATIENT)
Dept: CARDIOLOGY | Facility: HOSPITAL | Age: 84
DRG: 193 | End: 2024-10-24
Payer: MEDICARE

## 2024-10-24 PROBLEM — J18.9 FACILITY-ACQUIRED PNEUMONIA: Status: ACTIVE | Noted: 2024-10-24

## 2024-10-24 PROBLEM — J18.9 PNEUMONIA OF BOTH LUNGS DUE TO INFECTIOUS ORGANISM, UNSPECIFIED PART OF LUNG: Status: ACTIVE | Noted: 2024-10-24

## 2024-10-24 LAB
ALBUMIN SERPL BCP-MCNC: 3.4 G/DL (ref 3.4–5)
ANION GAP SERPL CALC-SCNC: 18 MMOL/L (ref 10–20)
ATRIAL RATE: 77 BPM
BUN SERPL-MCNC: 23 MG/DL (ref 6–23)
CALCIUM SERPL-MCNC: 8.6 MG/DL (ref 8.6–10.3)
CHLORIDE SERPL-SCNC: 95 MMOL/L (ref 98–107)
CO2 SERPL-SCNC: 24 MMOL/L (ref 21–32)
CREAT SERPL-MCNC: 0.79 MG/DL (ref 0.5–1.05)
EGFRCR SERPLBLD CKD-EPI 2021: 73 ML/MIN/1.73M*2
ERYTHROCYTE [DISTWIDTH] IN BLOOD BY AUTOMATED COUNT: 14.6 % (ref 11.5–14.5)
GLUCOSE BLD MANUAL STRIP-MCNC: 365 MG/DL (ref 74–99)
GLUCOSE BLD MANUAL STRIP-MCNC: 397 MG/DL (ref 74–99)
GLUCOSE BLD MANUAL STRIP-MCNC: 439 MG/DL (ref 74–99)
GLUCOSE BLD MANUAL STRIP-MCNC: 461 MG/DL (ref 74–99)
GLUCOSE BLD MANUAL STRIP-MCNC: 480 MG/DL (ref 74–99)
GLUCOSE SERPL-MCNC: 475 MG/DL (ref 74–99)
HCT VFR BLD AUTO: 41.9 % (ref 36–46)
HGB BLD-MCNC: 12.8 G/DL (ref 12–16)
INR PPP: 2.9 (ref 0.9–1.1)
MAGNESIUM SERPL-MCNC: 2.08 MG/DL (ref 1.6–2.4)
MCH RBC QN AUTO: 26.1 PG (ref 26–34)
MCHC RBC AUTO-ENTMCNC: 30.5 G/DL (ref 32–36)
MCV RBC AUTO: 86 FL (ref 80–100)
NRBC BLD-RTO: 0 /100 WBCS (ref 0–0)
PHOSPHATE SERPL-MCNC: 3.7 MG/DL (ref 2.5–4.9)
PLATELET # BLD AUTO: 193 X10*3/UL (ref 150–450)
POTASSIUM SERPL-SCNC: 4.6 MMOL/L (ref 3.5–5.3)
PROCALCITONIN SERPL-MCNC: 0.1 NG/ML
PROTHROMBIN TIME: 32.7 SECONDS (ref 9.8–12.8)
Q ONSET: 215 MS
QRS COUNT: 11 BEATS
QRS DURATION: 102 MS
QT INTERVAL: 408 MS
QTC CALCULATION(BAZETT): 443 MS
QTC FREDERICIA: 431 MS
R AXIS: 43 DEGREES
RBC # BLD AUTO: 4.9 X10*6/UL (ref 4–5.2)
SODIUM SERPL-SCNC: 132 MMOL/L (ref 136–145)
T AXIS: 246 DEGREES
T OFFSET: 419 MS
VENTRICULAR RATE: 71 BPM
WBC # BLD AUTO: 10.7 X10*3/UL (ref 4.4–11.3)

## 2024-10-24 PROCEDURE — 2500000001 HC RX 250 WO HCPCS SELF ADMINISTERED DRUGS (ALT 637 FOR MEDICARE OP)

## 2024-10-24 PROCEDURE — 2500000002 HC RX 250 W HCPCS SELF ADMINISTERED DRUGS (ALT 637 FOR MEDICARE OP, ALT 636 FOR OP/ED): Performed by: STUDENT IN AN ORGANIZED HEALTH CARE EDUCATION/TRAINING PROGRAM

## 2024-10-24 PROCEDURE — 99223 1ST HOSP IP/OBS HIGH 75: CPT | Performed by: STUDENT IN AN ORGANIZED HEALTH CARE EDUCATION/TRAINING PROGRAM

## 2024-10-24 PROCEDURE — 97165 OT EVAL LOW COMPLEX 30 MIN: CPT | Mod: GO | Performed by: OCCUPATIONAL THERAPIST

## 2024-10-24 PROCEDURE — 2500000004 HC RX 250 GENERAL PHARMACY W/ HCPCS (ALT 636 FOR OP/ED)

## 2024-10-24 PROCEDURE — 94640 AIRWAY INHALATION TREATMENT: CPT

## 2024-10-24 PROCEDURE — 80069 RENAL FUNCTION PANEL: CPT

## 2024-10-24 PROCEDURE — 82947 ASSAY GLUCOSE BLOOD QUANT: CPT

## 2024-10-24 PROCEDURE — 36415 COLL VENOUS BLD VENIPUNCTURE: CPT

## 2024-10-24 PROCEDURE — 93005 ELECTROCARDIOGRAM TRACING: CPT

## 2024-10-24 PROCEDURE — 85027 COMPLETE CBC AUTOMATED: CPT

## 2024-10-24 PROCEDURE — 85610 PROTHROMBIN TIME: CPT

## 2024-10-24 PROCEDURE — 2500000002 HC RX 250 W HCPCS SELF ADMINISTERED DRUGS (ALT 637 FOR MEDICARE OP, ALT 636 FOR OP/ED)

## 2024-10-24 PROCEDURE — 87449 NOS EACH ORGANISM AG IA: CPT | Mod: STJLAB

## 2024-10-24 PROCEDURE — 83735 ASSAY OF MAGNESIUM: CPT

## 2024-10-24 PROCEDURE — 97161 PT EVAL LOW COMPLEX 20 MIN: CPT | Mod: GP

## 2024-10-24 PROCEDURE — 87899 AGENT NOS ASSAY W/OPTIC: CPT | Mod: STJLAB

## 2024-10-24 PROCEDURE — 1100000001 HC PRIVATE ROOM DAILY

## 2024-10-24 PROCEDURE — 84145 PROCALCITONIN (PCT): CPT | Mod: STJLAB

## 2024-10-24 RX ORDER — IPRATROPIUM BROMIDE AND ALBUTEROL SULFATE 2.5; .5 MG/3ML; MG/3ML
3 SOLUTION RESPIRATORY (INHALATION) 3 TIMES DAILY
Status: DISCONTINUED | OUTPATIENT
Start: 2024-10-25 | End: 2024-10-29 | Stop reason: HOSPADM

## 2024-10-24 RX ORDER — BUPROPION HYDROCHLORIDE 100 MG/1
100 TABLET, EXTENDED RELEASE ORAL 2 TIMES DAILY
COMMUNITY
Start: 2017-02-08

## 2024-10-24 RX ORDER — IPRATROPIUM BROMIDE AND ALBUTEROL SULFATE 2.5; .5 MG/3ML; MG/3ML
3 SOLUTION RESPIRATORY (INHALATION) EVERY 2 HOUR PRN
Status: DISCONTINUED | OUTPATIENT
Start: 2024-10-24 | End: 2024-10-29 | Stop reason: HOSPADM

## 2024-10-24 RX ORDER — WARFARIN 4 MG/1
4 TABLET ORAL
COMMUNITY
Start: 2017-03-09

## 2024-10-24 RX ORDER — SIMVASTATIN 20 MG/1
20 TABLET, FILM COATED ORAL NIGHTLY
Status: DISCONTINUED | OUTPATIENT
Start: 2024-10-24 | End: 2024-10-29 | Stop reason: HOSPADM

## 2024-10-24 RX ORDER — INSULIN LISPRO 100 [IU]/ML
8 INJECTION, SOLUTION INTRAVENOUS; SUBCUTANEOUS ONCE
Status: COMPLETED | OUTPATIENT
Start: 2024-10-24 | End: 2024-10-24

## 2024-10-24 RX ORDER — ACETAMINOPHEN 500 MG
10 TABLET ORAL NIGHTLY
Status: DISCONTINUED | OUTPATIENT
Start: 2024-10-24 | End: 2024-10-29 | Stop reason: HOSPADM

## 2024-10-24 RX ORDER — METOPROLOL SUCCINATE 100 MG/1
100 TABLET, EXTENDED RELEASE ORAL 2 TIMES DAILY
COMMUNITY
Start: 2017-02-08

## 2024-10-24 RX ORDER — WARFARIN 7.5 MG/1
7.5 TABLET ORAL
COMMUNITY
Start: 2018-09-17

## 2024-10-24 RX ORDER — DOCUSATE SODIUM 100 MG/1
100 CAPSULE, LIQUID FILLED ORAL 2 TIMES DAILY
COMMUNITY
Start: 2017-08-28

## 2024-10-24 RX ORDER — IBUPROFEN 600 MG/1
600 TABLET ORAL EVERY 6 HOURS PRN
COMMUNITY
End: 2024-10-29 | Stop reason: HOSPADM

## 2024-10-24 RX ORDER — DIGOXIN 125 MCG
125 TABLET ORAL DAILY
COMMUNITY
Start: 2024-10-04

## 2024-10-24 RX ORDER — IPRATROPIUM BROMIDE AND ALBUTEROL SULFATE 2.5; .5 MG/3ML; MG/3ML
3 SOLUTION RESPIRATORY (INHALATION)
Status: DISCONTINUED | OUTPATIENT
Start: 2024-10-24 | End: 2024-10-24

## 2024-10-24 RX ORDER — AZITHROMYCIN 500 MG/1
500 TABLET, FILM COATED ORAL
Status: DISCONTINUED | OUTPATIENT
Start: 2024-10-24 | End: 2024-10-24

## 2024-10-24 RX ORDER — WARFARIN 4 MG/1
4 TABLET ORAL
Status: DISCONTINUED | OUTPATIENT
Start: 2024-10-25 | End: 2024-10-29 | Stop reason: HOSPADM

## 2024-10-24 RX ORDER — CEFTRIAXONE 2 G/50ML
2 INJECTION, SOLUTION INTRAVENOUS EVERY 24 HOURS
Status: DISCONTINUED | OUTPATIENT
Start: 2024-10-24 | End: 2024-10-27

## 2024-10-24 RX ORDER — CITALOPRAM 20 MG/1
20 TABLET, FILM COATED ORAL DAILY
Status: DISCONTINUED | OUTPATIENT
Start: 2024-10-24 | End: 2024-10-29 | Stop reason: HOSPADM

## 2024-10-24 RX ORDER — GLIPIZIDE 5 MG/1
5 TABLET ORAL EVERY MORNING
COMMUNITY
Start: 2024-10-09 | End: 2024-10-29 | Stop reason: HOSPADM

## 2024-10-24 RX ORDER — BENZONATATE 100 MG/1
100 CAPSULE ORAL 3 TIMES DAILY PRN
Status: DISCONTINUED | OUTPATIENT
Start: 2024-10-24 | End: 2024-10-29 | Stop reason: HOSPADM

## 2024-10-24 RX ORDER — INSULIN GLARGINE 100 [IU]/ML
35 INJECTION, SOLUTION SUBCUTANEOUS EVERY 24 HOURS
Status: DISCONTINUED | OUTPATIENT
Start: 2024-10-24 | End: 2024-10-25

## 2024-10-24 RX ORDER — OMEPRAZOLE 40 MG/1
40 CAPSULE, DELAYED RELEASE ORAL DAILY
COMMUNITY
Start: 2017-02-08

## 2024-10-24 RX ORDER — EPINEPHRINE 0.22MG
100 AEROSOL WITH ADAPTER (ML) INHALATION DAILY
COMMUNITY

## 2024-10-24 RX ORDER — INSULIN LISPRO 100 [IU]/ML
0-10 INJECTION, SOLUTION INTRAVENOUS; SUBCUTANEOUS
Status: DISCONTINUED | OUTPATIENT
Start: 2024-10-24 | End: 2024-10-29 | Stop reason: HOSPADM

## 2024-10-24 RX ORDER — INSULIN LISPRO 100 [IU]/ML
7 INJECTION, SOLUTION INTRAVENOUS; SUBCUTANEOUS 4 TIMES DAILY
COMMUNITY
End: 2024-10-29 | Stop reason: HOSPADM

## 2024-10-24 RX ORDER — SITAGLIPTIN 50 MG/1
50 TABLET, FILM COATED ORAL DAILY
COMMUNITY
Start: 2018-09-04 | End: 2024-10-29 | Stop reason: HOSPADM

## 2024-10-24 RX ORDER — TRAZODONE HYDROCHLORIDE 50 MG/1
50 TABLET ORAL NIGHTLY
Status: DISCONTINUED | OUTPATIENT
Start: 2024-10-24 | End: 2024-10-29 | Stop reason: HOSPADM

## 2024-10-24 RX ORDER — FERROUS SULFATE 325(65) MG
325 TABLET ORAL 2 TIMES DAILY
COMMUNITY

## 2024-10-24 RX ORDER — INSULIN LISPRO 100 [IU]/ML
0-10 INJECTION, SOLUTION INTRAVENOUS; SUBCUTANEOUS
Status: DISCONTINUED | OUTPATIENT
Start: 2024-10-24 | End: 2024-10-24

## 2024-10-24 RX ORDER — QUETIAPINE FUMARATE 25 MG/1
25 TABLET, FILM COATED ORAL NIGHTLY
Status: COMPLETED | OUTPATIENT
Start: 2024-10-24 | End: 2024-10-24

## 2024-10-24 RX ORDER — DEXTROSE 50 % IN WATER (D50W) INTRAVENOUS SYRINGE
12.5
Status: DISCONTINUED | OUTPATIENT
Start: 2024-10-24 | End: 2024-10-29 | Stop reason: HOSPADM

## 2024-10-24 RX ORDER — AMLODIPINE BESYLATE 10 MG/1
10 TABLET ORAL DAILY
COMMUNITY
Start: 2016-11-08

## 2024-10-24 RX ORDER — INSULIN LISPRO 100 [IU]/ML
10 INJECTION, SOLUTION INTRAVENOUS; SUBCUTANEOUS ONCE
Status: COMPLETED | OUTPATIENT
Start: 2024-10-24 | End: 2024-10-24

## 2024-10-24 RX ORDER — DEXTROSE 50 % IN WATER (D50W) INTRAVENOUS SYRINGE
25
Status: DISCONTINUED | OUTPATIENT
Start: 2024-10-24 | End: 2024-10-29 | Stop reason: HOSPADM

## 2024-10-24 RX ORDER — BUPROPION HYDROCHLORIDE 100 MG/1
100 TABLET, EXTENDED RELEASE ORAL 2 TIMES DAILY
Status: DISCONTINUED | OUTPATIENT
Start: 2024-10-24 | End: 2024-10-29 | Stop reason: HOSPADM

## 2024-10-24 RX ORDER — CITALOPRAM 20 MG/1
20 TABLET, FILM COATED ORAL DAILY
COMMUNITY
Start: 2017-02-08

## 2024-10-24 RX ORDER — LANOLIN ALCOHOL/MO/W.PET/CERES
400 CREAM (GRAM) TOPICAL DAILY
Status: DISCONTINUED | OUTPATIENT
Start: 2024-10-24 | End: 2024-10-29 | Stop reason: HOSPADM

## 2024-10-24 RX ORDER — LOSARTAN POTASSIUM 25 MG/1
25 TABLET ORAL DAILY
Status: DISCONTINUED | OUTPATIENT
Start: 2024-10-24 | End: 2024-10-29

## 2024-10-24 RX ORDER — TRAZODONE HYDROCHLORIDE 50 MG/1
50 TABLET ORAL NIGHTLY
COMMUNITY
Start: 2024-10-02

## 2024-10-24 RX ORDER — POTASSIUM CHLORIDE 20 MEQ/1
40 TABLET, EXTENDED RELEASE ORAL DAILY
Status: DISCONTINUED | OUTPATIENT
Start: 2024-10-24 | End: 2024-10-24

## 2024-10-24 RX ORDER — METOPROLOL SUCCINATE 100 MG/1
100 TABLET, EXTENDED RELEASE ORAL 2 TIMES DAILY
Status: DISCONTINUED | OUTPATIENT
Start: 2024-10-24 | End: 2024-10-29 | Stop reason: HOSPADM

## 2024-10-24 RX ORDER — AMLODIPINE BESYLATE 10 MG/1
10 TABLET ORAL DAILY
Status: DISCONTINUED | OUTPATIENT
Start: 2024-10-24 | End: 2024-10-29 | Stop reason: HOSPADM

## 2024-10-24 RX ORDER — SIMVASTATIN 20 MG/1
20 TABLET, FILM COATED ORAL NIGHTLY
COMMUNITY
Start: 2017-02-08

## 2024-10-24 RX ORDER — LOSARTAN POTASSIUM 25 MG/1
25 TABLET ORAL DAILY
Status: ON HOLD | COMMUNITY
Start: 2018-09-24 | End: 2024-10-29

## 2024-10-24 RX ORDER — POLYETHYLENE GLYCOL 3350 17 G/17G
17 POWDER, FOR SOLUTION ORAL DAILY
Status: DISCONTINUED | OUTPATIENT
Start: 2024-10-24 | End: 2024-10-29 | Stop reason: HOSPADM

## 2024-10-24 RX ORDER — ACETAMINOPHEN 500 MG
10 TABLET ORAL NIGHTLY
COMMUNITY
Start: 2024-10-01

## 2024-10-24 RX ORDER — INSULIN LISPRO 100 [IU]/ML
15 INJECTION, SOLUTION INTRAVENOUS; SUBCUTANEOUS ONCE
Status: DISCONTINUED | OUTPATIENT
Start: 2024-10-24 | End: 2024-10-24

## 2024-10-24 RX ORDER — POTASSIUM CHLORIDE 20 MEQ/1
2 TABLET, EXTENDED RELEASE ORAL DAILY
COMMUNITY
Start: 2018-09-04 | End: 2024-10-29 | Stop reason: HOSPADM

## 2024-10-24 RX ORDER — INSULIN GLARGINE 100 [IU]/ML
35 INJECTION, SOLUTION SUBCUTANEOUS EVERY 24 HOURS
Status: DISCONTINUED | OUTPATIENT
Start: 2024-10-25 | End: 2024-10-24

## 2024-10-24 RX ORDER — INSULIN LISPRO 100 [IU]/ML
7 INJECTION, SOLUTION INTRAVENOUS; SUBCUTANEOUS
Status: DISCONTINUED | OUTPATIENT
Start: 2024-10-24 | End: 2024-10-29 | Stop reason: HOSPADM

## 2024-10-24 RX ORDER — FUROSEMIDE 80 MG/1
80 TABLET ORAL DAILY
Status: ON HOLD | COMMUNITY
Start: 2024-10-17 | End: 2024-10-29

## 2024-10-24 RX ORDER — ALENDRONATE SODIUM 70 MG/1
70 TABLET ORAL
COMMUNITY
Start: 2024-07-17

## 2024-10-24 RX ORDER — AZITHROMYCIN 500 MG/1
500 TABLET, FILM COATED ORAL
Status: COMPLETED | OUTPATIENT
Start: 2024-10-24 | End: 2024-10-25

## 2024-10-24 RX ORDER — METFORMIN HYDROCHLORIDE 500 MG/1
500 TABLET ORAL 3 TIMES DAILY
COMMUNITY
Start: 2017-03-09

## 2024-10-24 RX ORDER — LANOLIN ALCOHOL/MO/W.PET/CERES
400 CREAM (GRAM) TOPICAL DAILY
COMMUNITY
Start: 2017-02-08

## 2024-10-24 RX ORDER — PANTOPRAZOLE SODIUM 40 MG/1
40 TABLET, DELAYED RELEASE ORAL
Status: DISCONTINUED | OUTPATIENT
Start: 2024-10-24 | End: 2024-10-29 | Stop reason: HOSPADM

## 2024-10-24 RX ORDER — INSULIN GLARGINE 100 [IU]/ML
25 INJECTION, SOLUTION SUBCUTANEOUS EVERY 24 HOURS
Status: DISCONTINUED | OUTPATIENT
Start: 2024-10-24 | End: 2024-10-24

## 2024-10-24 RX ORDER — INSULIN GLARGINE 100 [IU]/ML
50 INJECTION, SOLUTION SUBCUTANEOUS NIGHTLY
Status: ON HOLD | COMMUNITY
End: 2024-10-29

## 2024-10-24 RX ORDER — DIGOXIN 125 MCG
125 TABLET ORAL DAILY
Status: DISCONTINUED | OUTPATIENT
Start: 2024-10-24 | End: 2024-10-29 | Stop reason: HOSPADM

## 2024-10-24 SDOH — HEALTH STABILITY: PHYSICAL HEALTH
HOW OFTEN DO YOU NEED TO HAVE SOMEONE HELP YOU WHEN YOU READ INSTRUCTIONS, PAMPHLETS, OR OTHER WRITTEN MATERIAL FROM YOUR DOCTOR OR PHARMACY?: NEVER

## 2024-10-24 SDOH — SOCIAL STABILITY: SOCIAL INSECURITY: HAVE YOU HAD THOUGHTS OF HARMING ANYONE ELSE?: NO

## 2024-10-24 SDOH — ECONOMIC STABILITY: FOOD INSECURITY: WITHIN THE PAST 12 MONTHS, THE FOOD YOU BOUGHT JUST DIDN'T LAST AND YOU DIDN'T HAVE MONEY TO GET MORE.: NEVER TRUE

## 2024-10-24 SDOH — SOCIAL STABILITY: SOCIAL INSECURITY: DOES ANYONE TRY TO KEEP YOU FROM HAVING/CONTACTING OTHER FRIENDS OR DOING THINGS OUTSIDE YOUR HOME?: NO

## 2024-10-24 SDOH — ECONOMIC STABILITY: HOUSING INSECURITY: IN THE LAST 12 MONTHS, WAS THERE A TIME WHEN YOU WERE NOT ABLE TO PAY THE MORTGAGE OR RENT ON TIME?: NO

## 2024-10-24 SDOH — SOCIAL STABILITY: SOCIAL NETWORK: HOW OFTEN DO YOU GET TOGETHER WITH FRIENDS OR RELATIVES?: TWICE A WEEK

## 2024-10-24 SDOH — ECONOMIC STABILITY: FOOD INSECURITY: WITHIN THE PAST 12 MONTHS, YOU WORRIED THAT YOUR FOOD WOULD RUN OUT BEFORE YOU GOT THE MONEY TO BUY MORE.: NEVER TRUE

## 2024-10-24 SDOH — HEALTH STABILITY: MENTAL HEALTH: HOW OFTEN DO YOU HAVE A DRINK CONTAINING ALCOHOL?: NEVER

## 2024-10-24 SDOH — HEALTH STABILITY: MENTAL HEALTH: HOW OFTEN DO YOU HAVE SIX OR MORE DRINKS ON ONE OCCASION?: NEVER

## 2024-10-24 SDOH — SOCIAL STABILITY: SOCIAL INSECURITY: DO YOU FEEL UNSAFE GOING BACK TO THE PLACE WHERE YOU ARE LIVING?: NO

## 2024-10-24 SDOH — SOCIAL STABILITY: SOCIAL NETWORK
DO YOU BELONG TO ANY CLUBS OR ORGANIZATIONS SUCH AS CHURCH GROUPS, UNIONS, FRATERNAL OR ATHLETIC GROUPS, OR SCHOOL GROUPS?: NO

## 2024-10-24 SDOH — SOCIAL STABILITY: SOCIAL INSECURITY: WITHIN THE LAST YEAR, HAVE YOU BEEN HUMILIATED OR EMOTIONALLY ABUSED IN OTHER WAYS BY YOUR PARTNER OR EX-PARTNER?: NO

## 2024-10-24 SDOH — SOCIAL STABILITY: SOCIAL INSECURITY: HAVE YOU HAD ANY THOUGHTS OF HARMING ANYONE ELSE?: NO

## 2024-10-24 SDOH — ECONOMIC STABILITY: INCOME INSECURITY: IN THE PAST 12 MONTHS HAS THE ELECTRIC, GAS, OIL, OR WATER COMPANY THREATENED TO SHUT OFF SERVICES IN YOUR HOME?: NO

## 2024-10-24 SDOH — HEALTH STABILITY: PHYSICAL HEALTH: ON AVERAGE, HOW MANY DAYS PER WEEK DO YOU ENGAGE IN MODERATE TO STRENUOUS EXERCISE (LIKE A BRISK WALK)?: 0 DAYS

## 2024-10-24 SDOH — SOCIAL STABILITY: SOCIAL INSECURITY: ABUSE: ADULT

## 2024-10-24 SDOH — SOCIAL STABILITY: SOCIAL INSECURITY: ARE YOU MARRIED, WIDOWED, DIVORCED, SEPARATED, NEVER MARRIED, OR LIVING WITH A PARTNER?: DIVORCED

## 2024-10-24 SDOH — ECONOMIC STABILITY: HOUSING INSECURITY: IN THE PAST 12 MONTHS, HOW MANY TIMES HAVE YOU MOVED WHERE YOU WERE LIVING?: 0

## 2024-10-24 SDOH — SOCIAL STABILITY: SOCIAL INSECURITY: ARE THERE ANY APPARENT SIGNS OF INJURIES/BEHAVIORS THAT COULD BE RELATED TO ABUSE/NEGLECT?: NO

## 2024-10-24 SDOH — SOCIAL STABILITY: SOCIAL INSECURITY: WERE YOU ABLE TO COMPLETE ALL THE BEHAVIORAL HEALTH SCREENINGS?: YES

## 2024-10-24 SDOH — SOCIAL STABILITY: SOCIAL NETWORK: IN A TYPICAL WEEK, HOW MANY TIMES DO YOU TALK ON THE PHONE WITH FAMILY, FRIENDS, OR NEIGHBORS?: TWICE A WEEK

## 2024-10-24 SDOH — SOCIAL STABILITY: SOCIAL INSECURITY: HAS ANYONE EVER THREATENED TO HURT YOUR FAMILY OR YOUR PETS?: NO

## 2024-10-24 SDOH — SOCIAL STABILITY: SOCIAL INSECURITY: ARE YOU OR HAVE YOU BEEN THREATENED OR ABUSED PHYSICALLY, EMOTIONALLY, OR SEXUALLY BY ANYONE?: NO

## 2024-10-24 SDOH — HEALTH STABILITY: MENTAL HEALTH
DO YOU FEEL STRESS - TENSE, RESTLESS, NERVOUS, OR ANXIOUS, OR UNABLE TO SLEEP AT NIGHT BECAUSE YOUR MIND IS TROUBLED ALL THE TIME - THESE DAYS?: NOT AT ALL

## 2024-10-24 SDOH — ECONOMIC STABILITY: FOOD INSECURITY: HOW HARD IS IT FOR YOU TO PAY FOR THE VERY BASICS LIKE FOOD, HOUSING, MEDICAL CARE, AND HEATING?: NOT HARD AT ALL

## 2024-10-24 SDOH — SOCIAL STABILITY: SOCIAL INSECURITY
WITHIN THE LAST YEAR, HAVE YOU BEEN KICKED, HIT, SLAPPED, OR OTHERWISE PHYSICALLY HURT BY YOUR PARTNER OR EX-PARTNER?: NO

## 2024-10-24 SDOH — HEALTH STABILITY: PHYSICAL HEALTH: ON AVERAGE, HOW MANY MINUTES DO YOU ENGAGE IN EXERCISE AT THIS LEVEL?: 0 MIN

## 2024-10-24 SDOH — SOCIAL STABILITY: SOCIAL INSECURITY: WITHIN THE LAST YEAR, HAVE YOU BEEN AFRAID OF YOUR PARTNER OR EX-PARTNER?: NO

## 2024-10-24 SDOH — HEALTH STABILITY: MENTAL HEALTH: HOW MANY DRINKS CONTAINING ALCOHOL DO YOU HAVE ON A TYPICAL DAY WHEN YOU ARE DRINKING?: PATIENT DOES NOT DRINK

## 2024-10-24 SDOH — SOCIAL STABILITY: SOCIAL INSECURITY
WITHIN THE LAST YEAR, HAVE YOU BEEN RAPED OR FORCED TO HAVE ANY KIND OF SEXUAL ACTIVITY BY YOUR PARTNER OR EX-PARTNER?: NO

## 2024-10-24 SDOH — ECONOMIC STABILITY: TRANSPORTATION INSECURITY: IN THE PAST 12 MONTHS, HAS LACK OF TRANSPORTATION KEPT YOU FROM MEDICAL APPOINTMENTS OR FROM GETTING MEDICATIONS?: NO

## 2024-10-24 SDOH — ECONOMIC STABILITY: HOUSING INSECURITY: AT ANY TIME IN THE PAST 12 MONTHS, WERE YOU HOMELESS OR LIVING IN A SHELTER (INCLUDING NOW)?: NO

## 2024-10-24 SDOH — SOCIAL STABILITY: SOCIAL INSECURITY: DO YOU FEEL ANYONE HAS EXPLOITED OR TAKEN ADVANTAGE OF YOU FINANCIALLY OR OF YOUR PERSONAL PROPERTY?: NO

## 2024-10-24 SDOH — SOCIAL STABILITY: SOCIAL NETWORK: HOW OFTEN DO YOU ATTEND CHURCH OR RELIGIOUS SERVICES?: NEVER

## 2024-10-24 SDOH — SOCIAL STABILITY: SOCIAL NETWORK: HOW OFTEN DO YOU ATTEND MEETINGS OF THE CLUBS OR ORGANIZATIONS YOU BELONG TO?: NEVER

## 2024-10-24 ASSESSMENT — LIFESTYLE VARIABLES
HOW MANY STANDARD DRINKS CONTAINING ALCOHOL DO YOU HAVE ON A TYPICAL DAY: PATIENT DOES NOT DRINK
SKIP TO QUESTIONS 9-10: 1
HOW MANY STANDARD DRINKS CONTAINING ALCOHOL DO YOU HAVE ON A TYPICAL DAY: PATIENT DOES NOT DRINK
AUDIT-C TOTAL SCORE: 0
HOW OFTEN DO YOU HAVE A DRINK CONTAINING ALCOHOL: NEVER
AUDIT-C TOTAL SCORE: 0
HOW OFTEN DO YOU HAVE 6 OR MORE DRINKS ON ONE OCCASION: NEVER
SKIP TO QUESTIONS 9-10: 1
HOW OFTEN DO YOU HAVE 6 OR MORE DRINKS ON ONE OCCASION: NEVER
HOW OFTEN DO YOU HAVE A DRINK CONTAINING ALCOHOL: NEVER
SUBSTANCE_ABUSE_PAST_12_MONTHS: NO
PRESCIPTION_ABUSE_PAST_12_MONTHS: NO
SKIP TO QUESTIONS 9-10: 1
AUDIT-C TOTAL SCORE: 0

## 2024-10-24 ASSESSMENT — COGNITIVE AND FUNCTIONAL STATUS - GENERAL
PERSONAL GROOMING: A LOT
CLIMB 3 TO 5 STEPS WITH RAILING: TOTAL
PERSONAL GROOMING: A LOT
DAILY ACTIVITIY SCORE: 13
WALKING IN HOSPITAL ROOM: A LOT
MOBILITY SCORE: 16
HELP NEEDED FOR BATHING: A LOT
MOBILITY SCORE: 13
DAILY ACTIVITIY SCORE: 18
DRESSING REGULAR UPPER BODY CLOTHING: A LOT
DRESSING REGULAR UPPER BODY CLOTHING: A LITTLE
STANDING UP FROM CHAIR USING ARMS: A LITTLE
HELP NEEDED FOR BATHING: A LITTLE
EATING MEALS: A LITTLE
TURNING FROM BACK TO SIDE WHILE IN FLAT BAD: A LITTLE
PATIENT BASELINE BEDBOUND: NO
TOILETING: A LOT
STANDING UP FROM CHAIR USING ARMS: A LOT
DRESSING REGULAR LOWER BODY CLOTHING: A LOT
DRESSING REGULAR LOWER BODY CLOTHING: A LOT
TOILETING: A LOT
MOVING TO AND FROM BED TO CHAIR: A LOT
EATING MEALS: A LITTLE
CLIMB 3 TO 5 STEPS WITH RAILING: TOTAL
WALKING IN HOSPITAL ROOM: A LITTLE
MOVING FROM LYING ON BACK TO SITTING ON SIDE OF FLAT BED WITH BEDRAILS: A LITTLE
PERSONAL GROOMING: A LITTLE
MOBILITY SCORE: 18
MOVING TO AND FROM BED TO CHAIR: A LITTLE
WALKING IN HOSPITAL ROOM: A LOT
MOVING TO AND FROM BED TO CHAIR: A LITTLE
DRESSING REGULAR LOWER BODY CLOTHING: A LOT
HELP NEEDED FOR BATHING: A LOT
TURNING FROM BACK TO SIDE WHILE IN FLAT BAD: A LITTLE
CLIMB 3 TO 5 STEPS WITH RAILING: A LOT
DAILY ACTIVITIY SCORE: 14
TOILETING: A LOT
STANDING UP FROM CHAIR USING ARMS: A LOT

## 2024-10-24 ASSESSMENT — ACTIVITIES OF DAILY LIVING (ADL)
BATHING: NEEDS ASSISTANCE
GROOMING: NEEDS ASSISTANCE
DRESSING YOURSELF: NEEDS ASSISTANCE
FEEDING YOURSELF: INDEPENDENT
LACK_OF_TRANSPORTATION: NO
LACK_OF_TRANSPORTATION: NO
PATIENT'S MEMORY ADEQUATE TO SAFELY COMPLETE DAILY ACTIVITIES?: YES
TOILETING: NEEDS ASSISTANCE
HEARING - RIGHT EAR: HEARING AID
JUDGMENT_ADEQUATE_SAFELY_COMPLETE_DAILY_ACTIVITIES: YES
LACK_OF_TRANSPORTATION: NO
WALKS IN HOME: NEEDS ASSISTANCE
ADEQUATE_TO_COMPLETE_ADL: YES
HEARING - LEFT EAR: HEARING AID

## 2024-10-24 ASSESSMENT — PAIN - FUNCTIONAL ASSESSMENT
PAIN_FUNCTIONAL_ASSESSMENT: 0-10
PAIN_FUNCTIONAL_ASSESSMENT: 0-10

## 2024-10-24 ASSESSMENT — PAIN SCALES - GENERAL
PAINLEVEL_OUTOF10: 0 - NO PAIN

## 2024-10-24 ASSESSMENT — PATIENT HEALTH QUESTIONNAIRE - PHQ9
1. LITTLE INTEREST OR PLEASURE IN DOING THINGS: NOT AT ALL
1. LITTLE INTEREST OR PLEASURE IN DOING THINGS: NOT AT ALL
SUM OF ALL RESPONSES TO PHQ9 QUESTIONS 1 & 2: 0
2. FEELING DOWN, DEPRESSED OR HOPELESS: NOT AT ALL
SUM OF ALL RESPONSES TO PHQ9 QUESTIONS 1 & 2: 0
2. FEELING DOWN, DEPRESSED OR HOPELESS: NOT AT ALL

## 2024-10-24 NOTE — PROGRESS NOTES
Pharmacy Medication History Review    Joana Morales is a 84 y.o. female admitted for Facility-acquired pneumonia. Pharmacy reviewed the patient's ixjys-oz-yygzbqile medications and allergies for accuracy.    The list below reflects the updated PTA list.   Prior to Admission Medications   Prescriptions Last Dose Informant   HumaLOG KwikPen Insulin 100 unit/mL injection Unknown Other   Sig: Inject 7 Units under the skin 4 times a day.   Januvia 50 mg tablet Unknown Other   Sig: Take 1 tablet (50 mg) by mouth once daily.   alendronate (Fosamax) 70 mg tablet Unknown Other   Sig: Take 1 tablet (70 mg) by mouth every 7 days.   amLODIPine (Norvasc) 10 mg tablet Unknown Other   Sig: Take 1 tablet (10 mg) by mouth once daily.   buPROPion SR (Wellbutrin SR) 100 mg 12 hr tablet Unknown Other   Sig: Take 1 tablet (100 mg) by mouth 2 times a day.   citalopram (CeleXA) 20 mg tablet Unknown Other   Sig: Take 1 tablet (20 mg) by mouth once daily.   coenzyme Q-10 100 mg capsule Unknown Other   Sig: Take 1 capsule (100 mg) by mouth once daily.   digoxin (Lanoxin) 125 MCG tablet Unknown Other   Sig: Take 1 tablet (125 mcg) by mouth once daily.   docusate sodium (Colace) 100 mg capsule Unknown Other   Sig: Take 1 capsule (100 mg) by mouth 2 times a day.   ferrous sulfate, 325 mg ferrous sulfate, tablet Unknown Other   Sig: Take 1 tablet (325 mg) by mouth 2 times a day.   furosemide (Lasix) 80 mg tablet Unknown Other   Sig: Take 1 tablet (80 mg) by mouth once daily.   glipiZIDE (Glucotrol) 5 mg tablet Unknown Other   Sig: Take 1 tablet (5 mg) by mouth once daily in the morning.   ibuprofen 600 mg tablet Unknown Other   Sig: Take 1 tablet (600 mg) by mouth every 6 hours if needed for mild pain (1 - 3).   insulin glargine (Basaglar KwikPen U-100 Insulin) 100 unit/mL (3 mL) pen Unknown Other   Sig: Inject 50 Units under the skin once daily at bedtime. Take as directed per insulin instructions.   losartan (Cozaar) 25 mg tablet Unknown Other    Sig: Take 1 tablet (25 mg) by mouth once daily.   magnesium oxide (Mag-Ox) 400 mg (241.3 mg magnesium) tablet Unknown Other   Sig: Take 1 tablet (400 mg) by mouth once daily.   melatonin 5 mg tablet Unknown Other   Sig: Take 2 tablets (10 mg) by mouth once daily at bedtime.   metFORMIN (Glucophage) 500 mg tablet Unknown Other   Sig: Take 1 tablet (500 mg) by mouth 3 times a day.   metoprolol succinate XL (Toprol-XL) 100 mg 24 hr tablet Unknown Other   Sig: Take 1 tablet (100 mg) by mouth 2 times a day.   omeprazole (PriLOSEC) 40 mg DR capsule Unknown Other   Sig: Take 1 capsule (40 mg) by mouth once daily.   potassium chloride CR 20 mEq ER tablet Unknown Other   Sig: Take 2 tablets (40 mEq) by mouth once daily.   simvastatin (Zocor) 20 mg tablet Unknown Other   Sig: Take 1 tablet (20 mg) by mouth once daily at bedtime.   traZODone (Desyrel) 50 mg tablet Unknown Other   Sig: Take 1 tablet (50 mg) by mouth once daily at bedtime.   warfarin (Coumadin) 4 mg tablet Unknown Other   Sig: Take 1 tablet (4 mg) by mouth once a day on Monday, Wednesday, and Friday.   warfarin (Coumadin) 7.5 mg tablet Unknown Other   Sig: Take 1 tablet (7.5 mg) by mouth once a day on Sunday, Tuesday, Thursday, and Saturday.      Facility-Administered Medications: None        The list below reflects the updated allergy list. Please review each documented allergy for additional clarification and justification.  Allergies  Reviewed by José Luis Martinez on 10/23/2024   No Known Allergies         Patient was unable to be assessed for M2B at discharge.     Sources:   [facility] Medication Administration Report 9/1/24-9/30/24 faxed from [facility] 10/24/24  Verbal via phone with [facility] Staff for Page 1 of MAR that did not fax over   [facility] Dispense report    Medications ADDED:  All medications above added to chart  Medications CHANGED:  None  Medications REMOVED/MARKED NOT TAKING:   None     Additional Comments:  None    Devika Piña,  "PharmD  Transitions of Care Pharmacist  10/24/24     Secure Chat preferred   If no response call [phone] or Vocera \"Med Rec\"    "

## 2024-10-24 NOTE — PROGRESS NOTES
Occupational Therapy  Evaluation    Patient Name: Joana Morales  MRN: [id number]  Today's Date: 10/24/2024  Time Calculation  Start Time: 1141  Stop Time: 1152  Time Calculation (min): 11 min  3101/3101-A    Assessment  IP OT Assessment  OT Assessment: Patient demonstrates decreased independence with self care, decreased independence with functional transfers, decreased endurance,  and decreased balance.  Patient will benefit from skilled OT to address deficits.  Anticipate patient will progress to prior level of living with low intensity therapy.  Prognosis: Good  Barriers to Discharge: None  Evaluation/Treatment Tolerance: Patient tolerated treatment well  Medical Staff Made Aware: Yes  End of Session Communication: Bedside nurse  End of Session Patient Position: Up in chair, Alarm on (call light within reach and RN present)    Plan:  Treatment Interventions: ADL retraining, Functional transfer training, UE strengthening/ROM, Endurance training, Patient/family training  OT Frequency: 3 times per week  OT Discharge Recommendations: Low intensity level of continued care  Equipment Recommended upon Discharge: Wheeled walker  OT Recommended Transfer Status: Minimal assist  OT - OK to Discharge: Yes (to next level of care when medically cleared by physician)    Subjective     Current Problem:  1. Pneumonia of both lungs due to infectious organism, unspecified part of lung            General:  General  Reason for Referral: P/w c/o coughs. Pt admitted for facility acquired PNA.  Referred By: Dr. Eldridge  Past Medical History Relevant to Rehab: COPD on nocturnal 2L NC, IDDM II, paroxsymal afib on Coumadin, HTN, HLD, and GERD  Family/Caregiver Present: No  Co-Treatment: PT  Co-Treatment Reason: To maximize pt safety with functional mobility and discharge planning  Prior to Session Communication: Bedside nurse  Patient Position Received: Bed, 3 rail up, Alarm on  Preferred Learning Style: verbal  General Comment: Pt pleasant  and agreeable to work with therapy.    Precautions:  Medical Precautions: Fall precautions, Oxygen therapy device and L/min (3L O2 via NC)    Vital Signs:  SpO2:  (92-95% on 3L O2 via NC)    Pain:  Pain Assessment  Pain Assessment: 0-10  0-10 (Numeric) Pain Score: 0 - No pain    Objective     Cognition:  Orientation Level: Oriented X4             Home Living:  Home Living Comments: Pt resides at [facility]. She is independent with ADLs and uses a walker for mobility. She ambulates to the dining room for meals. Facility provides meals, housekeeping and medication management. Pt on 2L O2 at night. Pt denies any falls within the last 3 months.       ADL:  LE Dressing Assistance: Maximal    Activity Tolerance:  Endurance: Tolerates 10 - 20 min exercise with multiple rests    Bed Mobility/Transfers:   Bed Mobility  Bed Mobility: Yes  Bed Mobility 1  Bed Mobility 1: Supine to sitting, Scooting  Level of Assistance 1: Modified independent  Transfers  Transfer: Yes  Transfer 1  Transfer From 1: Bed to  Transfer to 1: Stand  Technique 1: Sit to stand  Transfer Device 1: Walker, Gait belt  Transfer Level of Assistance 1: Contact guard, Minimal verbal cues  Trials/Comments 1: Pt slow to rise. VCs for proper hand placement and body mechanics.  Transfers 2  Transfer From 2: Stand to  Transfer to 2: Chair with arms  Technique 2: Stand to sit  Transfer Device 2: Walker, Gait belt  Transfer Level of Assistance 2: Contact guard, Minimal verbal cues      Sensation:  Light Touch: No apparent deficits  Sensation Comment: Pt denies any n/t.      Outcome Measures: [facility] Daily Activity  Putting on and taking off regular lower body clothing: A lot  Bathing (including washing, rinsing, drying): A little  Putting on and taking off regular upper body clothing: None  Toileting, which includes using toilet, bedpan or urinal: A lot  Taking care of personal grooming such as brushing teeth: A little  Eating Meals: None  Daily Activity -  Total Score: 18           EDUCATION:  Education  Individual(s) Educated: Patient  Education Provided: Fall precautons, Risk and benefits of OT discussed with patient or other  Plan of Care Discussed and Agreed Upon: yes  Patient Response to Education: Patient/Caregiver Verbalized Understanding of Information    Goals:   Encounter Problems       Encounter Problems (Active)       OT Goals       Patient will complete functional transfers with mod I. (Progressing)       Start:  10/24/24    Expected End:  11/07/24            Patient will complete LE dressing with mod I. (Progressing)       Start:  10/24/24    Expected End:  11/07/24            Patient will complete toileting with mod I. (Progressing)       Start:  10/24/24    Expected End:  11/07/24

## 2024-10-24 NOTE — PROGRESS NOTES
Physical Therapy    Physical Therapy Evaluation    Patient Name: Joana Morales  MRN: [id number]  Today's Date: 10/24/2024   Time Calculation  Start Time: 1142  Stop Time: 1150  Time Calculation (min): 8 min  3101/3101-A    Assessment/Plan   PT Assessment  PT Assessment Results: Decreased strength, Decreased range of motion, Decreased endurance, Impaired balance, Decreased mobility, Decreased safety awareness, Impaired judgement  Rehab Prognosis: Good  Evaluation/Treatment Tolerance: Patient limited by fatigue  Medical Staff Made Aware: Yes  Strengths: Access to adaptive/assistive products, Capable of completing ADLs semi/independent, Housing layout, Support of Caregivers  End of Session Communication: Bedside nurse  Assessment Comment: Pt is a 83 y/o female admitted for facility acquired PNA. Pt presents with decreased strength, endurance and balance. Pt able to tolerate bed mobility, transfers and ambulating short distance from bed to chair. She is functioning below baseline level of function and will benefit from skilled therapy during stay to improve overall functional mobility and safety awareness. Upon discharge pt will benefit from low intensity therapy with 24 hr assist for continued improvement in functional mobility.     End of Session Patient Position: Up in chair, Alarm on (call light within reach and RN present)  IP OR SWING BED PT PLAN  Inpatient or Swing Bed: Inpatient  PT Plan  Treatment/Interventions: Bed mobility, Transfer training, Gait training, Stair training, Balance training, Neuromuscular re-education, Endurance training, Strengthening, Range of motion, Therapeutic exercise, Therapeutic activity, Home exercise program, Positioning, Postural re-education  PT Plan: Ongoing PT  PT Frequency: 4 times per week  PT Discharge Recommendations: Low intensity level of continued care (with 24 hr assist)  Equipment Recommended upon Discharge: Wheeled walker  PT Recommended Transfer Status: Assist x1,  Assistive device, Contact guard  PT - OK to Discharge: Yes (Once medically cleared)    Subjective     Current Problem:  1. Pneumonia of both lungs due to infectious organism, unspecified part of lung          Patient Active Problem List   Diagnosis    Facility-acquired pneumonia    Pneumonia of both lungs due to infectious organism, unspecified part of lung     General Visit Information:  General  Reason for Referral: P/w c/o coughs. Pt admitted for facility acquired PNA.  Referred By: Dr. Eldridge  Past Medical History Relevant to Rehab: COPD on nocturnal 2L NC, IDDM II, paroxsymal afib on Coumadin, HTN, HLD, and GERD  Family/Caregiver Present: No  Co-Treatment: OT  Co-Treatment Reason: To maximize pt safety with functional mobility and discharge planning  Prior to Session Communication: Bedside nurse  Patient Position Received: Bed, 3 rail up, Alarm on  Preferred Learning Style: verbal  General Comment: Pt pleasant and agreeable to work with therapy.    Home Living/PLOF:  Home Living  Home Living Comments: Pt resides at [facility]. She is independent with ADLs and uses a walker for mobility. She ambulates to the dining room for meals. Facility provides meals, housekeeping and medication management. Pt on 2L O2 at night. Pt denies any falls within the last 3 months.    Precautions:  Precautions  Medical Precautions: Fall precautions, Oxygen therapy device and L/min (3L O2 via NC)    Vital Signs:  Vital Signs  SpO2:  (92-95% on 3L O2 via NC)    Objective     Pain:  Pain Assessment  Pain Assessment: 0-10  0-10 (Numeric) Pain Score: 0 - No pain    Cognition:  Cognition  Overall Cognitive Status: Within Functional Limits  Orientation Level: Oriented X4    General Assessments:      Activity Tolerance  Endurance: Tolerates 10 - 20 min exercise with multiple rests    Sensation  Light Touch: No apparent deficits  Sensation Comment: Pt denies any n/t.     Static Sitting Balance  Static Sitting-Balance Support:  Bilateral upper extremity supported, Feet supported  Static Sitting-Level of Assistance: Close supervision  Static Standing Balance  Static Standing-Balance Support: Bilateral upper extremity supported  Static Standing-Level of Assistance: Contact guard  Dynamic Standing Balance  Dynamic Standing-Balance Support: Bilateral upper extremity supported  Dynamic Standing-Level of Assistance: Contact guard    Functional Assessments:     Bed Mobility  Bed Mobility: Yes  Bed Mobility 1  Bed Mobility 1: Supine to sitting, Scooting  Level of Assistance 1: Modified independent    Transfers  Transfer: Yes  Transfer 1  Transfer From 1: Bed to  Transfer to 1: Stand  Technique 1: Sit to stand  Transfer Device 1: Walker, Gait belt  Transfer Level of Assistance 1: Contact guard, Minimal verbal cues  Trials/Comments 1: Pt slow to rise. VCs for proper hand placement and body mechanics.  Transfers 2  Transfer From 2: Stand to  Transfer to 2: Chair with arms  Technique 2: Stand to sit  Transfer Device 2: Walker, Gait belt  Transfer Level of Assistance 2: Contact guard, Minimal verbal cues    Ambulation/Gait Training  Ambulation/Gait Training Performed: Yes  Ambulation/Gait Training 1  Surface 1: Level tile  Device 1: Rolling walker  Gait Support Devices: Gait belt  Assistance 1: Contact guard, Minimal verbal cues  Quality of Gait 1: Wide base of support, Diminished heel strike, Forward flexed posture (decreased smith)  Comments/Distance (ft) 1: ~4 ft from bed > chair; VCs for safety awareness with FWW    Extremity/Trunk Assessments:     RLE   RLE : Within Functional Limits  LLE   LLE : Within Functional Limits    Outcome Measures:     [facility] Basic Mobility  Turning from your back to your side while in a flat bed without using bedrails: None  Moving from lying on your back to sitting on the side of a flat bed without using bedrails: None  Moving to and from bed to chair (including a wheelchair): A little  Standing up from a chair using  your arms (e.g. wheelchair or bedside chair): A little  To walk in hospital room: A little  Climbing 3-5 steps with railing: Total  Basic Mobility - Total Score: 18     Goals:  Encounter Problems       Encounter Problems (Active)       Mobility       Pt will be able to ambulate >/= 25 ft with FWW CGA with good safety awareness.        Start:  10/24/24    Expected End:  11/07/24            Pt will complete supine, seated and standing exercises to maintain/improve overall strength with minimal verbal cues.         Start:  10/24/24    Expected End:  11/07/24               PT Transfers       Pt will be able to complete all transfers with LRD CGA demonstrating good safety awareness and proper body mechanics.        Start:  10/24/24    Expected End:  11/07/24                 Education Documentation  Precautions, taught by Sydney García PT at 10/24/2024  1:37 PM.  Learner: Patient  Readiness: Acceptance  Method: Explanation  Response: Verbalizes Understanding, Demonstrated Understanding, Needs Reinforcement    Body Mechanics, taught by Sydney García PT at 10/24/2024  1:37 PM.  Learner: Patient  Readiness: Acceptance  Method: Explanation  Response: Verbalizes Understanding, Demonstrated Understanding, Needs Reinforcement    Home Exercise Program, taught by Sydney García PT at 10/24/2024  1:37 PM.  Learner: Patient  Readiness: Acceptance  Method: Explanation  Response: Verbalizes Understanding, Demonstrated Understanding, Needs Reinforcement    Mobility Training, taught by Sydney García PT at 10/24/2024  1:37 PM.  Learner: Patient  Readiness: Acceptance  Method: Explanation  Response: Verbalizes Understanding, Demonstrated Understanding, Needs Reinforcement    Education Comments  No comments found.

## 2024-10-24 NOTE — CARE PLAN
The patient's goals for the shift include remain free of falls    The clinical goals for the shift include remain afebrile      Problem: Pain - Adult  Goal: Verbalizes/displays adequate comfort level or baseline comfort level  Outcome: Progressing     Problem: Safety - Adult  Goal: Free from fall injury  Outcome: Progressing     Problem: Discharge Planning  Goal: Discharge to home or other facility with appropriate resources  Outcome: Progressing     Problem: Chronic Conditions and Co-morbidities  Goal: Patient's chronic conditions and co-morbidity symptoms are monitored and maintained or improved  Outcome: Progressing     Problem: Skin  Goal: Decreased wound size/increased tissue granulation at next dressing change  Outcome: Progressing  Goal: Participates in plan/prevention/treatment measures  Outcome: Progressing  Goal: Prevent/manage excess moisture  Outcome: Progressing  Goal: Prevent/minimize sheer/friction injuries  Outcome: Progressing  Goal: Promote/optimize nutrition  Outcome: Progressing  Goal: Promote skin healing  Outcome: Progressing     Problem: Fall/Injury  Goal: Not fall by end of shift  Outcome: Progressing  Goal: Be free from injury by end of the shift  Outcome: Progressing  Goal: Verbalize understanding of personal risk factors for fall in the hospital  Outcome: Progressing  Goal: Verbalize understanding of risk factor reduction measures to prevent injury from fall in the home  Outcome: Progressing  Goal: Use assistive devices by end of the shift  Outcome: Progressing  Goal: Pace activities to prevent fatigue by end of the shift  Outcome: Progressing     Problem: Respiratory  Goal: Clear secretions with interventions this shift  Outcome: Progressing  Goal: Minimize anxiety/maximize coping throughout shift  Outcome: Progressing  Goal: Minimal/no exertional discomfort or dyspnea this shift  Outcome: Progressing  Goal: No signs of respiratory distress (eg. Use of accessory muscles. Peds  grunting)  Outcome: Progressing  Goal: Patent airway maintained this shift  Outcome: Progressing  Goal: Tolerate mechanical ventilation evidenced by VS/agitation level this shift  Outcome: Progressing  Goal: Tolerate pulmonary toileting this shift  Outcome: Progressing  Goal: Verbalize decreased shortness of breath this shift  Outcome: Progressing  Goal: Wean oxygen to maintain O2 saturation per order/standard this shift  Outcome: Progressing  Goal: Increase self care and/or family involvement in next 24 hours  Outcome: Progressing

## 2024-10-24 NOTE — PROGRESS NOTES
10/24/24 1118   Discharge Planning   Living Arrangements Alone   Support Systems Family members   Assistance Needed yes   Type of Residence Assisted living   Care Facility Name [facility] or Post Acute Services Post acute facilities (Rehab/SNF/etc)   Type of Post Acute Facility Services Assisted living   Expected Discharge Disposition Home   Financial Resource Strain   How hard is it for you to pay for the very basics like food, housing, medical care, and heating? Not hard   Housing Stability   In the last 12 months, was there a time when you were not able to pay the mortgage or rent on time? N   At any time in the past 12 months, were you homeless or living in a shelter (including now)? N   Transportation Needs   In the past 12 months, has lack of transportation kept you from medical appointments or from getting medications? no   In the past 12 months, has lack of transportation kept you from meetings, work, or from getting things needed for daily living? No   Patient Choice   Patient / Family choosing to utilize agency / facility established prior to hospitalization Yes     Spoke to patient at bedside to explain my role in discharge planning. Patient states she lives at [facility]. [facility] provides meals, passes medication, provides housekeeping and laundry. Patient states she uses a walker with ambulation. Patient states she feels she effectively manages her health at home and plans to return to [facility] when medically ready for d/c. Therapy evals pending at this time.

## 2024-10-24 NOTE — H&P
Internal Medicine    Admission H&P      Patient Joana Morales PCP Sonia Neumann MD    MRN [id number]  Admission Date 10/23/2024      Chief Complaint/Reason for Admission:  Joana is a 84 y.o. female who presented today from [facility] via EMS with coughs.    Subjective    Subjective   History of Presenting Illness  Ms. Joana Morales is a 84 y.o. female with a primary complaint of coughs whose PMH is significant for COPD on nocturnal 2L NC, IDDM II, paroxsymal afib on Coumadin, HTN, HLD, and GERD.  Patient reports she has been having dry coughs for the past few days.  She denies any known sick contacts, shortness of breath, fever, or chills.  Upon review of ambulance record, patient is normally on nocturnal 2 L nasal cannula but staff administered 2 L nasal cannula during the day due to low SpO2.  Patient's pulse ox was 84 to 88% on 2 L nasal cannula so EMS was called by staff.  Patient currently endorses nonproductive cough.  She reports improvement after receiving DuoNeb breathing treatment in the emergency room.  She denies having pain, shortness of breath, abdominal pain, dysuria, or polyuria.    ED course:  VS: /70  Pulse 78  Temp 36.2 °C  SpO2 93% on 4 L NC  RR 20  Labs: CMP notable for slight hyperglycemia of 125, CBC notable for leukocytosis of 13, slightly elevated BNP of 208, negative repeat troponins flu and COVID PCRs  EKG: Atrial fibrillation, heart rate of 80 bpm  Imaging: Multifocal pneumonia involving posterior RUL and posterior LLL and scattered areas of atelectasis on CT PE. No acute or chronic pulmonary emboli and a 3 mm sclerotic bone lesion within the T8 vertebral body.  Intervention: Rocephin 1 g IV and Zithromax 500 mg p.o. for pneumonia, Decadron 12 mg p.o., DuoNeb breathing treatment, magnesium sulfate 2 g IV, and Lawrence catheter insertion due to urinary retention.     Patient was admitted to [facility] without telemetry for further management of pneumonia due to increased oxygen needs  "and ambulatory pulse ox of 93% on 4L nasal cannula.     Past Medical History  Active Ambulatory Problems     Diagnosis Date Noted    No Active Ambulatory Problems     Resolved Ambulatory Problems     Diagnosis Date Noted    No Resolved Ambulatory Problems     Past Medical History:   Diagnosis Date    Central retinal vein occlusion, left eye, stable (CMS-HCC) 03/23/2017    Central retinal vein occlusion, unspecified eye, stable (CMS-HCC) 05/12/2016    Personal history of (healed) traumatic fracture 02/27/2018    Personal history of other diseases of the circulatory system     Personal history of other endocrine, nutritional and metabolic disease 02/23/2016    Type 2 diabetes mellitus with mild nonproliferative 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 04/27/2017    Type 2 diabetes mellitus with mild nonproliferative 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, bilateral 05/12/2016    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 No alcohol or recreational drug use.    Home Medication:  Prior to Admission medications    Not on File        Family History  No relevant family history.    Allergies:  No Known Allergies     Review of System:  12-system ROS negative except as documented in HPI    Objective    Objective   Vital Signs:  Visit Vitals  /79 (BP Location: Right arm, Patient Position: Lying)   Pulse 93   Temp 36.9 °C (98.4 °F) (Temporal)   Resp 20   Ht 1.651 m (5' 5\")   Wt 79.4 kg (175 lb)   SpO2 (!) 93% Comment: pt on 4ltrs   BMI 29.12 kg/m²   Smoking Status Never "   BSA 1.91 m²        Physical Examination:  General: A&Ox4, alert, coopertive, not in acute distress, resting comfortably in bed upon examination   HEENT: Normocephalic, atraumatic, EOMI, moist mucous membranes  Neck: Neck supple, trachea midline, no evidence of trauma  Cardiovascular: Irregularly irregular rhythm, rate, S1 & S2 appreciated, no murmurs, rubs, or gallops appreciated, distal pulses 2+ bilaterally (radial and dorsalis pedis)  Respiratory: CTAB, no respiratory distress, no wheezing, rales or rhonchi, active coughs during exam and saturating at 94% on 4 L NC  Abdomen: Soft, nondistended, nontender to palpation, +bowel sounds, no guarding rigidity or rebound tenderness, Lawrence catheter in place  MSK: No joint swelling, normal movements of all extremities.   Neuro: No focal deficits, normal motor function, normal sensation, follows all commands. No asterixis noted  Skin: Warm and dry, no lesions, contusions, or erythema.  Psychiatric: Judgment intact.  Appropriate mood, affect and behavior.    Laboratory Data:  Results for orders placed or performed during the hospital encounter of 10/23/24 (from the past 24 hours)   CBC with Differential   Result Value Ref Range    WBC 13.1 (H) 4.4 - 11.3 x10*3/uL    nRBC 0.0 0.0 - 0.0 /100 WBCs    RBC 5.13 4.00 - 5.20 x10*6/uL    Hemoglobin 13.6 12.0 - 16.0 g/dL    Hematocrit 42.9 36.0 - 46.0 %    MCV 84 80 - 100 fL    MCH 26.5 26.0 - 34.0 pg    MCHC 31.7 (L) 32.0 - 36.0 g/dL    RDW 14.9 (H) 11.5 - 14.5 %    Platelets 194 150 - 450 x10*3/uL    Neutrophils % 82.4 40.0 - 80.0 %    Immature Granulocytes %, Automated 0.3 0.0 - 0.9 %    Lymphocytes % 7.6 13.0 - 44.0 %    Monocytes % 8.5 2.0 - 10.0 %    Eosinophils % 0.9 0.0 - 6.0 %    Basophils % 0.3 0.0 - 2.0 %    Neutrophils Absolute 10.79 (H) 1.60 - 5.50 x10*3/uL    Immature Granulocytes Absolute, Automated 0.04 0.00 - 0.50 x10*3/uL    Lymphocytes Absolute 1.00 0.80 - 3.00 x10*3/uL    Monocytes Absolute 1.11 (H) 0.05 - 0.80  x10*3/uL    Eosinophils Absolute 0.12 0.00 - 0.40 x10*3/uL    Basophils Absolute 0.04 0.00 - 0.10 x10*3/uL   Comprehensive Metabolic Panel   Result Value Ref Range    Glucose 125 (H) 74 - 99 mg/dL    Sodium 139 136 - 145 mmol/L    Potassium 3.8 3.5 - 5.3 mmol/L    Chloride 100 98 - 107 mmol/L    Bicarbonate 29 21 - 32 mmol/L    Anion Gap 14 mmol/L    Urea Nitrogen 17 6 - 23 mg/dL    Creatinine 0.80 0.50 - 1.05 mg/dL    eGFR 72 >60 mL/min/1.73m*2    Calcium 9.2 8.6 - 10.3 mg/dL    Albumin 3.7 3.4 - 5.0 g/dL    Alkaline Phosphatase 52 33 - 136 U/L    Total Protein 6.5 6.4 - 8.2 g/dL    AST 9 9 - 39 U/L    Bilirubin, Total 0.8 0.0 - 1.2 mg/dL    ALT 7 7 - 45 U/L   Brain Natriuretic Peptide   Result Value Ref Range     (H) 0 - 99 pg/mL   Troponin I, High Sensitivity, Initial   Result Value Ref Range    Troponin I, High Sensitivity 11 0 - 13 ng/L   Light Blue Top   Result Value Ref Range    Extra Tube Hold for add-ons.    Influenza A, and B PCR   Result Value Ref Range    Flu A Result Not Detected Not Detected    Flu B Result Not Detected Not Detected   Sars-CoV-2 PCR   Result Value Ref Range    Coronavirus 2019, PCR Not Detected Not Detected   Troponin, High Sensitivity, 1 Hour   Result Value Ref Range    Troponin I, High Sensitivity 11 0 - 13 ng/L       Imaging:  CT angio chest for pulmonary embolism    Result Date: 10/23/2024  STUDY: CT Angiogram of the Chest; 10/23/2024 7:40 PM INDICATION: Hypoxia, leg swelling.  COMPARISON: None available. ACCESSION NUMBER(S): [id number] ORDERING CLINICIAN: ANAHI REYES TECHNIQUE:  CTA of the chest was performed with intravenous contrast. Images are reviewed and processed at a workstation according to the CT angiogram protocol with 3-D and/or MIP post processing imaging generated.  Omnipaque 350:60 mL was administered intravenously. Automated mA/kV exposure control was utilized and patient examination was performed in strict accordance with principles of ALARA. FINDINGS:  Pulmonary arteries are adequately opacified without acute or chronic filling defects.  The thoracic aorta is normal in course and caliber without dissection or aneurysm. The heart is top normal in size without pericardial effusion. Thoracic lymph nodes are not enlarged. There is no pleural effusion, pleural thickening, or pneumothorax. The airways are patent. Lungs there is airspace disease within the posterior right upper lobe and posterior left lower lung compatible compatible with multifocal pneumonia.  Series #407 slice 46.  Scattered areas of atelectasis seen at both posterior lung bases.  Upper abdomen demonstrates no acute pathology. There are no acute fractures.  No suspicious bony lesions.  There are multilevel degenerative changes of the thoracic spine with disc desiccation from T8-9 through T11-12 inclusive.  3 mm sclerotic bone lesion identified within the T8 vertebral body.    1. No acute or chronic pulmonary emboli. 2. No thoracic aortic aneurysm or dissection. 3. Multifocal pneumonia involving the posterior right upper lobe and posterior left lower lung.  Scattered areas of atelectasis demonstrated at both posterior lung bases. 4. Multilevel degenerative changes of the thoracic spine with disc desiccation from T8-9 through T11-12 inclusive. 5. 3 mm sclerotic bone lesion identified within the T8 vertebral body. Signed by Rohit Gong MD    XR chest 1 view    Result Date: 10/23/2024  Interpreted By:  Meet Alaniz, STUDY: XR CHEST 1 VIEW  10/23/2024 2:02 pm   INDICATION: Signs/Symptoms:Chest Pain   COMPARISON: 08/29/2018   ACCESSION NUMBER(S): [id number]   ORDERING CLINICIAN: OBED DAS   TECHNIQUE: A single AP portable radiograph of the chest was obtained.   FINDINGS: Multiple cardiac monitoring leads are seen over the chest.   No focal infiltrate, pleural effusion or pneumothorax is identified. The cardiac silhouette is within normal limits for size.       No focal infiltrate or pneumothorax is  identified.   MACRO: None.   Signed by: Meet Alaniz 10/23/2024 2:36 PM Dictation workstation:   [id number]      Medications:  Scheduled medications  azithromycin, 500 mg, oral, q24h ROSY  cefTRIAXone, 2 g, intravenous, q24h  insulin lispro, 0-10 Units, subcutaneous, TID  polyethylene glycol, 17 g, oral, Daily      Continuous medications     PRN medications  PRN medications: benzonatate, ipratropium-albuteroL  Assessment    Assessment & Plan   Ms. Joana Morales is a 84 y.o. female who presents with a primary complaint of coughs whose PMH is significant for COPD on nocturnal 2L NC, IDDM II, paroxsymal afib on Coumadin, HTN, HLD, and GERD.  In the emergency room, patient was hemodynamically stable but required 4 L NC compared to her home nocturnal 2 L NC.  Labs remarkable for leukocytosis of 13.  CT angio chest showed multifocal pneumonia in the posterior RUL and posterior LLL. Patient will be admitted to [facility] without telemetry for further treatment of pneumonia.     #Acute hypoxic respiratory failure 2/2 to pneumonia in posterior RUL and posterior LLL  - Legionella, strep pneumo urine antigens, and procalcitonin pending  - Continue Rocephin 2 g IV daily until 10/27 and Zithromax 500 mg p.o. until 10/25  - Wean O2 as tolerated, maintain SpO2 >92%  - Encourage incentive spirometry use  - Duonebs q2h prn  - Tessalon Perles every 8 hours as needed for coughs    #IDDM II  -On Lispro sliding scale #2  -On glargine 50 units at home, held for now as she's slightly hyperglycemic at 125  -Monitor morning CMP and order Lantus as needed    #A-fib on Coumadin  -PT/INR ordered    CHRONIC PROBLEMS:  # HTN  # HLD  # GERD  - Continue home medications as appropriate    Global Plan of Care  IVF: As needed  Diet: Consistent carb  DVT prophylaxis: On Coumadin for A-fib  Bowel regime: MiraLAX  Dispo: Anticipate 1-2 midnight stays, pending clinical and oxygen requirement improvement   Consults: None  Code Status: Full Code     Patient seen  and discussed with senior resident. To be staffed with attending.  Signature: Cristin SIMONS Doyle, DO  Date: October 24, 2024  This note has been transcribed using Dragon voice recognition system and there is a possibility of unintentional typing misprints.  Any information found to be copied from previous providers is done in the best interest of the patient to provide accurate, quality, and continuity of care.    _______________________________________________________________  Senior addendum  Joana Morales is an 84-year-old female with a past medical history notable for COPD on home oxygen 2 L at nighttime, IDDM 2, PAF on Coumadin, HTN, HLD, GERD who presented to the [facility] ED via EMS from her nursing facility due to a nonproductive cough for the past few days.  She denies any fevers chills shortness of breath nausea vomiting or diarrhea.  She lives in a nursing facility however states there has been no one sick with any upper respiratory symptoms around her area.  Per EMS report, patient was found to be hypoxic 84 to 88% on her baseline 2 L.  Upon arrival to the emergency department patient was afebrile 98.4, heart rate 78, respiratory rate 20, blood pressure 167/70, 93% on 4 L nasal cannula.  Labs reveal a leukocytosis of 13.1 otherwise unremarkable.  COVID/flu negative.  CT chest with multifocal pneumonia involving the posterior right upper lobe and left lower lobe.  Patient received Rocephin and azithromycin as well as Decadron 12 mg in DuoNebs in the ED.  On my exam patient is AO x 3.  She appears mildly uncomfortable coughing during examination.  Lungs appear rhonchorous, no wheezing noted.  Heart is irregularly irregular.  In summary this is an 84-year-old female with a history of COPD presenting for hypoxia and a nonproductive cough found to have community acquired pneumonia.  Will continue treatment with Rocephin and azithromycin pending urine antigens.  Anticipate 1-2 hospital days pending improvement in  oxygen requirements.    Devika Addison,   Internal medicine, PGY 3

## 2024-10-25 LAB
ALBUMIN SERPL BCP-MCNC: 3.2 G/DL (ref 3.4–5)
ANION GAP SERPL CALC-SCNC: 12 MMOL/L (ref 10–20)
BUN SERPL-MCNC: 34 MG/DL (ref 6–23)
CALCIUM SERPL-MCNC: 8.6 MG/DL (ref 8.6–10.3)
CHLORIDE SERPL-SCNC: 97 MMOL/L (ref 98–107)
CO2 SERPL-SCNC: 27 MMOL/L (ref 21–32)
CREAT SERPL-MCNC: 0.76 MG/DL (ref 0.5–1.05)
EGFRCR SERPLBLD CKD-EPI 2021: 76 ML/MIN/1.73M*2
ERYTHROCYTE [DISTWIDTH] IN BLOOD BY AUTOMATED COUNT: 14.6 % (ref 11.5–14.5)
GLUCOSE BLD MANUAL STRIP-MCNC: 101 MG/DL (ref 74–99)
GLUCOSE BLD MANUAL STRIP-MCNC: 210 MG/DL (ref 74–99)
GLUCOSE BLD MANUAL STRIP-MCNC: 325 MG/DL (ref 74–99)
GLUCOSE BLD MANUAL STRIP-MCNC: 329 MG/DL (ref 74–99)
GLUCOSE BLD MANUAL STRIP-MCNC: 366 MG/DL (ref 74–99)
GLUCOSE BLD MANUAL STRIP-MCNC: 373 MG/DL (ref 74–99)
GLUCOSE BLD MANUAL STRIP-MCNC: 423 MG/DL (ref 74–99)
GLUCOSE BLD MANUAL STRIP-MCNC: 429 MG/DL (ref 74–99)
GLUCOSE BLD MANUAL STRIP-MCNC: 432 MG/DL (ref 74–99)
GLUCOSE BLD MANUAL STRIP-MCNC: 98 MG/DL (ref 74–99)
GLUCOSE SERPL-MCNC: 363 MG/DL (ref 74–99)
HCT VFR BLD AUTO: 41.7 % (ref 36–46)
HGB BLD-MCNC: 13.1 G/DL (ref 12–16)
INR PPP: 4.8 (ref 0.9–1.1)
LEGIONELLA AG UR QL: NEGATIVE
MAGNESIUM SERPL-MCNC: 2.08 MG/DL (ref 1.6–2.4)
MCH RBC QN AUTO: 26.7 PG (ref 26–34)
MCHC RBC AUTO-ENTMCNC: 31.4 G/DL (ref 32–36)
MCV RBC AUTO: 85 FL (ref 80–100)
NRBC BLD-RTO: 0 /100 WBCS (ref 0–0)
PHOSPHATE SERPL-MCNC: 3 MG/DL (ref 2.5–4.9)
PLATELET # BLD AUTO: 216 X10*3/UL (ref 150–450)
POTASSIUM SERPL-SCNC: 4 MMOL/L (ref 3.5–5.3)
PROTHROMBIN TIME: 55.4 SECONDS (ref 9.8–12.8)
RBC # BLD AUTO: 4.9 X10*6/UL (ref 4–5.2)
S PNEUM AG UR QL: NEGATIVE
SODIUM SERPL-SCNC: 132 MMOL/L (ref 136–145)
WBC # BLD AUTO: 11.6 X10*3/UL (ref 4.4–11.3)

## 2024-10-25 PROCEDURE — 97110 THERAPEUTIC EXERCISES: CPT | Mod: GP,CQ

## 2024-10-25 PROCEDURE — 99233 SBSQ HOSP IP/OBS HIGH 50: CPT

## 2024-10-25 PROCEDURE — 2500000005 HC RX 250 GENERAL PHARMACY W/O HCPCS

## 2024-10-25 PROCEDURE — 97537 COMMUNITY/WORK REINTEGRATION: CPT | Mod: GO,CO

## 2024-10-25 PROCEDURE — 2500000002 HC RX 250 W HCPCS SELF ADMINISTERED DRUGS (ALT 637 FOR MEDICARE OP, ALT 636 FOR OP/ED)

## 2024-10-25 PROCEDURE — 80069 RENAL FUNCTION PANEL: CPT

## 2024-10-25 PROCEDURE — 2500000001 HC RX 250 WO HCPCS SELF ADMINISTERED DRUGS (ALT 637 FOR MEDICARE OP)

## 2024-10-25 PROCEDURE — 1100000001 HC PRIVATE ROOM DAILY

## 2024-10-25 PROCEDURE — 85027 COMPLETE CBC AUTOMATED: CPT

## 2024-10-25 PROCEDURE — 82947 ASSAY GLUCOSE BLOOD QUANT: CPT

## 2024-10-25 PROCEDURE — 97116 GAIT TRAINING THERAPY: CPT | Mod: GP,CQ

## 2024-10-25 PROCEDURE — 36415 COLL VENOUS BLD VENIPUNCTURE: CPT

## 2024-10-25 PROCEDURE — 2500000004 HC RX 250 GENERAL PHARMACY W/ HCPCS (ALT 636 FOR OP/ED)

## 2024-10-25 PROCEDURE — 94640 AIRWAY INHALATION TREATMENT: CPT

## 2024-10-25 PROCEDURE — 97530 THERAPEUTIC ACTIVITIES: CPT | Mod: GO,CO

## 2024-10-25 PROCEDURE — 85610 PROTHROMBIN TIME: CPT

## 2024-10-25 PROCEDURE — 83735 ASSAY OF MAGNESIUM: CPT

## 2024-10-25 PROCEDURE — 97535 SELF CARE MNGMENT TRAINING: CPT | Mod: GO,CO

## 2024-10-25 RX ORDER — INSULIN GLARGINE 100 [IU]/ML
50 INJECTION, SOLUTION SUBCUTANEOUS EVERY 24 HOURS
Status: DISCONTINUED | OUTPATIENT
Start: 2024-10-25 | End: 2024-10-27

## 2024-10-25 RX ORDER — INSULIN LISPRO 100 [IU]/ML
6 INJECTION, SOLUTION INTRAVENOUS; SUBCUTANEOUS ONCE
Status: DISCONTINUED | OUTPATIENT
Start: 2024-10-25 | End: 2024-10-25

## 2024-10-25 ASSESSMENT — COGNITIVE AND FUNCTIONAL STATUS - GENERAL
CLIMB 3 TO 5 STEPS WITH RAILING: TOTAL
DRESSING REGULAR LOWER BODY CLOTHING: A LOT
TOILETING: A LITTLE
DAILY ACTIVITIY SCORE: 17
DAILY ACTIVITIY SCORE: 13
WALKING IN HOSPITAL ROOM: A LITTLE
MOVING FROM LYING ON BACK TO SITTING ON SIDE OF FLAT BED WITH BEDRAILS: A LITTLE
MOVING TO AND FROM BED TO CHAIR: A LITTLE
MOBILITY SCORE: 18
TOILETING: A LOT
CLIMB 3 TO 5 STEPS WITH RAILING: TOTAL
EATING MEALS: A LITTLE
MOBILITY SCORE: 13
STANDING UP FROM CHAIR USING ARMS: A LOT
PERSONAL GROOMING: A LOT
DRESSING REGULAR LOWER BODY CLOTHING: A LOT
MOVING TO AND FROM BED TO CHAIR: A LOT
STANDING UP FROM CHAIR USING ARMS: A LITTLE
HELP NEEDED FOR BATHING: A LOT
DRESSING REGULAR UPPER BODY CLOTHING: A LOT
WALKING IN HOSPITAL ROOM: A LOT
HELP NEEDED FOR BATHING: A LOT
TURNING FROM BACK TO SIDE WHILE IN FLAT BAD: A LITTLE
PERSONAL GROOMING: A LITTLE
DRESSING REGULAR UPPER BODY CLOTHING: A LITTLE

## 2024-10-25 ASSESSMENT — PAIN SCALES - GENERAL
PAINLEVEL_OUTOF10: 0 - NO PAIN

## 2024-10-25 ASSESSMENT — PAIN - FUNCTIONAL ASSESSMENT
PAIN_FUNCTIONAL_ASSESSMENT: 0-10

## 2024-10-25 ASSESSMENT — ACTIVITIES OF DAILY LIVING (ADL)
EFFECT OF PAIN ON DAILY ACTIVITIES: .
HOME_MANAGEMENT_TIME_ENTRY: 15

## 2024-10-25 NOTE — CARE PLAN
The patient's goals for the shift include remain free of falls.    The clinical goals for the shift include Patients blood sugar levels will decrease by end of shift.

## 2024-10-25 NOTE — CARE PLAN
Problem: Pain - Adult  Goal: Verbalizes/displays adequate comfort level or baseline comfort level  Outcome: Progressing     Problem: Safety - Adult  Goal: Free from fall injury  Outcome: Progressing     Problem: Discharge Planning  Goal: Discharge to home or other facility with appropriate resources  Outcome: Progressing     Problem: Chronic Conditions and Co-morbidities  Goal: Patient's chronic conditions and co-morbidity symptoms are monitored and maintained or improved  Outcome: Progressing     Problem: Skin  Goal: Decreased wound size/increased tissue granulation at next dressing change  Outcome: Progressing  Goal: Participates in plan/prevention/treatment measures  Outcome: Progressing  Goal: Prevent/manage excess moisture  Outcome: Progressing  Goal: Prevent/minimize sheer/friction injuries  Outcome: Progressing  Goal: Promote/optimize nutrition  Outcome: Progressing  Goal: Promote skin healing  Outcome: Progressing     Problem: Fall/Injury  Goal: Not fall by end of shift  Outcome: Progressing  Goal: Be free from injury by end of the shift  Outcome: Progressing  Goal: Verbalize understanding of personal risk factors for fall in the hospital  Outcome: Progressing  Goal: Verbalize understanding of risk factor reduction measures to prevent injury from fall in the home  Outcome: Progressing  Goal: Use assistive devices by end of the shift  Outcome: Progressing  Goal: Pace activities to prevent fatigue by end of the shift  Outcome: Progressing     Problem: Respiratory  Goal: Clear secretions with interventions this shift  Outcome: Progressing  Goal: Minimize anxiety/maximize coping throughout shift  Outcome: Progressing  Goal: Minimal/no exertional discomfort or dyspnea this shift  Outcome: Progressing  Goal: No signs of respiratory distress (eg. Use of accessory muscles. Peds grunting)  Outcome: Progressing  Goal: Patent airway maintained this shift  Outcome: Progressing  Goal: Tolerate mechanical ventilation  evidenced by VS/agitation level this shift  Outcome: Progressing  Goal: Tolerate pulmonary toileting this shift  Outcome: Progressing  Goal: Verbalize decreased shortness of breath this shift  Outcome: Progressing  Goal: Wean oxygen to maintain O2 saturation per order/standard this shift  Outcome: Progressing  Goal: Increase self care and/or family involvement in next 24 hours  Outcome: Progressing   The patient's goals for the shift include remain free of falls    The clinical goals for the shift include Pt will remain free from fall/injury

## 2024-10-25 NOTE — PROGRESS NOTES
Physical Therapy    Physical Therapy    Physical Therapy Treatment    Patient Name: Joana Morales  MRN: [id number]  Today's Date: 10/25/2024  Time Calculation  Start Time: 1004  Stop Time: 1035  Time Calculation (min): 31 min     3116/3116-A    Assessment/Plan   PT Assessment  PT Assessment Results: Decreased strength, Decreased endurance, Impaired balance, Decreased mobility  Rehab Prognosis: Good  End of Session Communication: Bedside nurse  End of Session Patient Position: Up in chair, Alarm on  PT Plan  Inpatient/Swing Bed or Outpatient: Inpatient  PT Plan  Treatment/Interventions: Bed mobility, Transfer training, Gait training, Stair training, Balance training, Neuromuscular re-education, Endurance training, Strengthening, Range of motion, Therapeutic exercise, Therapeutic activity, Home exercise program, Positioning, Postural re-education  PT Plan: Ongoing PT  PT Frequency: 4 times per week  PT Discharge Recommendations: Low intensity level of continued care (with 24 hr assist)  Equipment Recommended upon Discharge: Wheeled walker  PT Recommended Transfer Status: Assist x1, Assistive device, Contact guard  PT - OK to Discharge: Yes (Once medically cleared)     10/25/24 1004   PT  Visit   PT Received On 10/25/24   Response to Previous Treatment Patient with no complaints from previous session.   General   Reason for Referral P/w c/o coughs. Pt admitted for facility acquired PNA.   Referred By Dr. Eldridge   Prior to Session Communication Bedside nurse   Patient Position Received Bed, 3 rail up   General Comment Pt pleasant and agreeable to work with therapy.   Precautions   Medical Precautions Fall precautions   Pain Assessment   Pain Assessment 0-10   0-10 (Numeric) Pain Score 0 - No pain   Effect of Pain on Daily Activities .   Cognition   Orientation Level Disoriented to place   Therapeutic Exercise   Therapeutic Exercise Performed Yes   Therapeutic Exercise Activity 1 Heel Raises, Toe Raises, LAQ. Marching x   10 reps each B   Bed Mobility   Bed Mobility Yes   Bed Mobility 1   Bed Mobility 1 Supine to sitting   Level of Assistance 1 Minimum assistance   Bed Mobility 2   Bed Mobility  2 Sitting to supine   Level of Assistance 2 Minimum assistance   Ambulation/Gait Training   Ambulation/Gait Training Performed Yes   Ambulation/Gait Training 1   Surface 1 Level tile   Device 1 Rolling walker   Gait Support Devices Gait belt   Assistance 1 Contact guard;Minimal verbal cues   Quality of Gait 1 WBOS;Diminished heel strike;Forward flexed posture   Comments/Distance (ft) 1 50   (Mild retro lean upon standing  hands on assist required while ambulating)   Transfers   Transfer Yes   Transfer 1   Technique 1 Sit to stand;Stand to sit   Transfer Device 1 Walker;Gait belt   Transfer Level of Assistance 1 Contact guard;Minimal verbal cues   Trials/Comments 1 x2   PT Assessment   PT Assessment Results Decreased strength;Decreased endurance;Impaired balance;Decreased mobility   Rehab Prognosis Good   End of Session Communication Bedside nurse   End of Session Patient Position Up in chair;Alarm on   Outpatient Education   Individual(s) Educated Patient   Education Provided Fall Risk   PT Plan   Inpatient/Swing Bed or Outpatient Inpatient     Outcome Measures:  [facility] Basic Mobility  Turning from your back to your side while in a flat bed without using bedrails: None  Moving from lying on your back to sitting on the side of a flat bed without using bedrails: None  Moving to and from bed to chair (including a wheelchair): A little  Standing up from a chair using your arms (e.g. wheelchair or bedside chair): A little  To walk in hospital room: A little  Climbing 3-5 steps with railing: Total  Basic Mobility - Total Score: 18                             EDUCATION:  Outpatient Education  Individual(s) Educated: Patient  Education Provided: Fall Risk  Education Documentation  No documentation found.  Education Comments  No comments  found.        GOALS:  Encounter Problems       Encounter Problems (Active)       Mobility       Pt will be able to ambulate >/= 25 ft with FWW CGA with good safety awareness.        Start:  10/24/24    Expected End:  11/07/24            Pt will complete supine, seated and standing exercises to maintain/improve overall strength with minimal verbal cues.         Start:  10/24/24    Expected End:  11/07/24               PT Transfers       Pt will be able to complete all transfers with LRD CGA demonstrating good safety awareness and proper body mechanics.        Start:  10/24/24    Expected End:  11/07/24               Pain - Adult          Safety       LTG - Patient will adhere to hip precautions during ADL's and transfers       Start:  10/24/24            LTG - Patient will demonstrate safety requirements appropriate to situation/environment       Start:  10/24/24            LTG - Patient will utilize safety techniques       Start:  10/24/24            STG - Patient locks brakes on wheelchair       Start:  10/24/24            STG - Patient uses call light consistently to request assistance with transfers       Start:  10/24/24            STG - Patient uses gait belt during all transfers       Start:  10/24/24            Goal 1       Start:  10/24/24            Goal 2       Start:  10/24/24            Goal 3       Start:  10/24/24

## 2024-10-25 NOTE — PROGRESS NOTES
"ID:  Joana Morales is a 84 y.o. female on hospital day 1 of admission presenting with Facility-acquired pneumonia.    Subjective   The patient seen and examined this morning at bedside. No overnight events. The patient denied chest pain, fever, nausea, vomiting, diarrhea, headache, blurring of vision. Warfarin is held because of high INR (4.8) otherwise patient has no active site bleeding.        Objective     Visit Vitals  /76 Comment: Notified nurse.   Pulse 65   Temp 35.3 °C (95.5 °F) (Temporal)   Resp 18   Ht 1.651 m (5' 5\")   Wt 79.4 kg (175 lb)   SpO2 96%   BMI 29.12 kg/m²   Smoking Status Never   BSA 1.91 m²        Physical Examination:    General: A&Ox4, alert, coopertive, not in acute distress, resting comfortably in bed upon examination   HEENT: Normocephalic, atraumatic, EOMI, moist mucous membranes  Neck: Neck supple, trachea midline, no evidence of trauma  Cardiovascular:  S1 & S2 appreciated, no murmurs, rubs, or gallops appreciated  Respiratory:  no respiratory distress, no wheezing, rales or rhonchi  Abdomen: Soft, nondistended, nontender to palpation, +bowel sounds, no guarding rigidity or rebound tenderness  MSK: No joint swelling, normal movements of all extremities.   Neuro: No focal deficits, normal motor function, normal sensation, follows all commands. No asterixis noted  Skin: Warm and dry, no lesions, contusions, or erythema.  Psychiatric: Judgment intact.  Appropriate mood, affect and behavior.  Laboratory Data:      Results for orders placed or performed during the hospital encounter of 10/23/24 (from the past 96 hours)   CBC with Differential   Result Value Ref Range    WBC 13.1 (H) 4.4 - 11.3 x10*3/uL    nRBC 0.0 0.0 - 0.0 /100 WBCs    RBC 5.13 4.00 - 5.20 x10*6/uL    Hemoglobin 13.6 12.0 - 16.0 g/dL    Hematocrit 42.9 36.0 - 46.0 %    MCV 84 80 - 100 fL    MCH 26.5 26.0 - 34.0 pg    MCHC 31.7 (L) 32.0 - 36.0 g/dL    RDW 14.9 (H) 11.5 - 14.5 %    Platelets 194 150 - 450 x10*3/uL    " Neutrophils % 82.4 40.0 - 80.0 %    Immature Granulocytes %, Automated 0.3 0.0 - 0.9 %    Lymphocytes % 7.6 13.0 - 44.0 %    Monocytes % 8.5 2.0 - 10.0 %    Eosinophils % 0.9 0.0 - 6.0 %    Basophils % 0.3 0.0 - 2.0 %    Neutrophils Absolute 10.79 (H) 1.60 - 5.50 x10*3/uL    Immature Granulocytes Absolute, Automated 0.04 0.00 - 0.50 x10*3/uL    Lymphocytes Absolute 1.00 0.80 - 3.00 x10*3/uL    Monocytes Absolute 1.11 (H) 0.05 - 0.80 x10*3/uL    Eosinophils Absolute 0.12 0.00 - 0.40 x10*3/uL    Basophils Absolute 0.04 0.00 - 0.10 x10*3/uL   Comprehensive Metabolic Panel   Result Value Ref Range    Glucose 125 (H) 74 - 99 mg/dL    Sodium 139 136 - 145 mmol/L    Potassium 3.8 3.5 - 5.3 mmol/L    Chloride 100 98 - 107 mmol/L    Bicarbonate 29 21 - 32 mmol/L    Anion Gap 14 mmol/L    Urea Nitrogen 17 6 - 23 mg/dL    Creatinine 0.80 0.50 - 1.05 mg/dL    eGFR 72 >60 mL/min/1.73m*2    Calcium 9.2 8.6 - 10.3 mg/dL    Albumin 3.7 3.4 - 5.0 g/dL    Alkaline Phosphatase 52 33 - 136 U/L    Total Protein 6.5 6.4 - 8.2 g/dL    AST 9 9 - 39 U/L    Bilirubin, Total 0.8 0.0 - 1.2 mg/dL    ALT 7 7 - 45 U/L   Brain Natriuretic Peptide   Result Value Ref Range     (H) 0 - 99 pg/mL   Troponin I, High Sensitivity, Initial   Result Value Ref Range    Troponin I, High Sensitivity 11 0 - 13 ng/L   Light Blue Top   Result Value Ref Range    Extra Tube Hold for add-ons.    ECG 12 lead   Result Value Ref Range    Ventricular Rate 71 BPM    Atrial Rate 77 BPM    QRS Duration 102 ms    QT Interval 408 ms    QTC Calculation(Bazett) 443 ms    R Axis 43 degrees    T Axis 246 degrees    QRS Count 11 beats    Q Onset 215 ms    T Offset 419 ms    QTC Fredericia 431 ms   Influenza A, and B PCR   Result Value Ref Range    Flu A Result Not Detected Not Detected    Flu B Result Not Detected Not Detected   Sars-CoV-2 PCR   Result Value Ref Range    Coronavirus 2019, PCR Not Detected Not Detected   Troponin, High Sensitivity, 1 Hour   Result Value Ref  Range    Troponin I, High Sensitivity 11 0 - 13 ng/L   Procalcitonin   Result Value Ref Range    Procalcitonin 0.10 (H) <=0.07 ng/mL   POCT GLUCOSE   Result Value Ref Range    POCT Glucose 365 (H) 74 - 99 mg/dL   CBC   Result Value Ref Range    WBC 10.7 4.4 - 11.3 x10*3/uL    nRBC 0.0 0.0 - 0.0 /100 WBCs    RBC 4.90 4.00 - 5.20 x10*6/uL    Hemoglobin 12.8 12.0 - 16.0 g/dL    Hematocrit 41.9 36.0 - 46.0 %    MCV 86 80 - 100 fL    MCH 26.1 26.0 - 34.0 pg    MCHC 30.5 (L) 32.0 - 36.0 g/dL    RDW 14.6 (H) 11.5 - 14.5 %    Platelets 193 150 - 450 x10*3/uL   Magnesium   Result Value Ref Range    Magnesium 2.08 1.60 - 2.40 mg/dL   Renal Function Panel   Result Value Ref Range    Glucose 475 (HH) 74 - 99 mg/dL    Sodium 132 (L) 136 - 145 mmol/L    Potassium 4.6 3.5 - 5.3 mmol/L    Chloride 95 (L) 98 - 107 mmol/L    Bicarbonate 24 21 - 32 mmol/L    Anion Gap 18 10 - 20 mmol/L    Urea Nitrogen 23 6 - 23 mg/dL    Creatinine 0.79 0.50 - 1.05 mg/dL    eGFR 73 >60 mL/min/1.73m*2    Calcium 8.6 8.6 - 10.3 mg/dL    Phosphorus 3.7 2.5 - 4.9 mg/dL    Albumin 3.4 3.4 - 5.0 g/dL   Protime-INR   Result Value Ref Range    Protime 32.7 (H) 9.8 - 12.8 seconds    INR 2.9 (H) 0.9 - 1.1   POCT GLUCOSE   Result Value Ref Range    POCT Glucose 461 (H) 74 - 99 mg/dL   POCT GLUCOSE   Result Value Ref Range    POCT Glucose 480 (H) 74 - 99 mg/dL   POCT GLUCOSE   Result Value Ref Range    POCT Glucose 423 (H) 74 - 99 mg/dL   POCT GLUCOSE   Result Value Ref Range    POCT Glucose 432 (H) 74 - 99 mg/dL   POCT GLUCOSE   Result Value Ref Range    POCT Glucose 366 (H) 74 - 99 mg/dL   POCT GLUCOSE   Result Value Ref Range    POCT Glucose 329 (H) 74 - 99 mg/dL   POCT GLUCOSE   Result Value Ref Range    POCT Glucose 439 (H) 74 - 99 mg/dL   POCT GLUCOSE   Result Value Ref Range    POCT Glucose 397 (H) 74 - 99 mg/dL   CBC   Result Value Ref Range    WBC 11.6 (H) 4.4 - 11.3 x10*3/uL    nRBC 0.0 0.0 - 0.0 /100 WBCs    RBC 4.90 4.00 - 5.20 x10*6/uL    Hemoglobin  13.1 12.0 - 16.0 g/dL    Hematocrit 41.7 36.0 - 46.0 %    MCV 85 80 - 100 fL    MCH 26.7 26.0 - 34.0 pg    MCHC 31.4 (L) 32.0 - 36.0 g/dL    RDW 14.6 (H) 11.5 - 14.5 %    Platelets 216 150 - 450 x10*3/uL   Magnesium   Result Value Ref Range    Magnesium 2.08 1.60 - 2.40 mg/dL   Renal Function Panel   Result Value Ref Range    Glucose 363 (H) 74 - 99 mg/dL    Sodium 132 (L) 136 - 145 mmol/L    Potassium 4.0 3.5 - 5.3 mmol/L    Chloride 97 (L) 98 - 107 mmol/L    Bicarbonate 27 21 - 32 mmol/L    Anion Gap 12 10 - 20 mmol/L    Urea Nitrogen 34 (H) 6 - 23 mg/dL    Creatinine 0.76 0.50 - 1.05 mg/dL    eGFR 76 >60 mL/min/1.73m*2    Calcium 8.6 8.6 - 10.3 mg/dL    Phosphorus 3.0 2.5 - 4.9 mg/dL    Albumin 3.2 (L) 3.4 - 5.0 g/dL   Protime-INR   Result Value Ref Range    Protime 55.4 (HH) 9.8 - 12.8 seconds    INR 4.8 (H) 0.9 - 1.1   POCT GLUCOSE   Result Value Ref Range    POCT Glucose 373 (H) 74 - 99 mg/dL   POCT GLUCOSE   Result Value Ref Range    POCT Glucose 429 (H) 74 - 99 mg/dL   POCT GLUCOSE   Result Value Ref Range    POCT Glucose 325 (H) 74 - 99 mg/dL        Imaging:  ECG 12 lead    Result Date: 10/24/2024  Atrial fibrillation ST & T wave abnormality, consider inferior ischemia or digitalis effect Abnormal ECG No previous ECGs available    CT angio chest for pulmonary embolism    Result Date: 10/23/2024  STUDY: CT Angiogram of the Chest; 10/23/2024 7:40 PM INDICATION: Hypoxia, leg swelling.  COMPARISON: None available. ACCESSION NUMBER(S): [id number] ORDERING CLINICIAN: ANAHI REYES TECHNIQUE:  CTA of the chest was performed with intravenous contrast. Images are reviewed and processed at a workstation according to the CT angiogram protocol with 3-D and/or MIP post processing imaging generated.  Omnipaque 350:60 mL was administered intravenously. Automated mA/kV exposure control was utilized and patient examination was performed in strict accordance with principles of ALARA. FINDINGS: Pulmonary arteries are  adequately opacified without acute or chronic filling defects.  The thoracic aorta is normal in course and caliber without dissection or aneurysm. The heart is top normal in size without pericardial effusion. Thoracic lymph nodes are not enlarged. There is no pleural effusion, pleural thickening, or pneumothorax. The airways are patent. Lungs there is airspace disease within the posterior right upper lobe and posterior left lower lung compatible compatible with multifocal pneumonia.  Series #407 slice 46.  Scattered areas of atelectasis seen at both posterior lung bases.  Upper abdomen demonstrates no acute pathology. There are no acute fractures.  No suspicious bony lesions.  There are multilevel degenerative changes of the thoracic spine with disc desiccation from T8-9 through T11-12 inclusive.  3 mm sclerotic bone lesion identified within the T8 vertebral body.    1. No acute or chronic pulmonary emboli. 2. No thoracic aortic aneurysm or dissection. 3. Multifocal pneumonia involving the posterior right upper lobe and posterior left lower lung.  Scattered areas of atelectasis demonstrated at both posterior lung bases. 4. Multilevel degenerative changes of the thoracic spine with disc desiccation from T8-9 through T11-12 inclusive. 5. 3 mm sclerotic bone lesion identified within the T8 vertebral body. Signed by Rohit Gong MD    XR chest 1 view    Result Date: 10/23/2024  Interpreted By:  Meet Alaniz, STUDY: XR CHEST 1 VIEW  10/23/2024 2:02 pm   INDICATION: Signs/Symptoms:Chest Pain   COMPARISON: 08/29/2018   ACCESSION NUMBER(S): [id number]   ORDERING CLINICIAN: OBED DAS   TECHNIQUE: A single AP portable radiograph of the chest was obtained.   FINDINGS: Multiple cardiac monitoring leads are seen over the chest.   No focal infiltrate, pleural effusion or pneumothorax is identified. The cardiac silhouette is within normal limits for size.       No focal infiltrate or pneumothorax is identified.   MACRO:  None.   Signed by: Meet Alaniz 10/23/2024 2:36 PM Dictation workstation:   [id number]      Medications:  Scheduled medications  amLODIPine, 10 mg, oral, Daily  azithromycin, 500 mg, oral, q24h ROSY  buPROPion SR, 100 mg, oral, BID  cefTRIAXone, 2 g, intravenous, q24h  citalopram, 20 mg, oral, Daily  digoxin, 125 mcg, oral, Daily  insulin glargine, 50 Units, subcutaneous, q24h  insulin lispro, 0-10 Units, subcutaneous, Before meals & nightly  insulin lispro, 7 Units, subcutaneous, TID  ipratropium-albuteroL, 3 mL, nebulization, TID  losartan, 25 mg, oral, Daily  magnesium oxide, 400 mg, oral, Daily  melatonin, 10 mg, oral, Nightly  metoprolol succinate XL, 100 mg, oral, BID  pantoprazole, 40 mg, oral, Daily before breakfast  polyethylene glycol, 17 g, oral, Daily  simvastatin, 20 mg, oral, Nightly  traZODone, 50 mg, oral, Nightly  [Held by provider] warfarin, 4 mg, oral, Once per day on Monday Wednesday Friday  [Held by provider] warfarin, 7.5 mg, oral, Once per day on Sunday Tuesday Thursday Saturday      Continuous medications     PRN medications  PRN medications: benzonatate, dextrose, dextrose, glucagon, glucagon, ipratropium-albuteroL, oxygen    Assessment    Assessment & Plan   Ms. Joana Morales is a 84 y.o. female who presents with a primary complaint of dry cough whose PMH is significant for COPD on nocturnal 2L NC, IDDM II, paroxsymal afib on Coumadin, HTN, HLD, and GERD.  In the emergency room, patient was hemodynamically stable but required 4 L NC compared to her home nocturnal 2 L NC.  Labs in the ED remarkable for leukocytosis of 13.  CT angio chest showed multifocal pneumonia in the posterior RUL and posterior LLL.  Patient treated with Rocephin and azithromycin.    #Acute on chronic hypoxic respiratory failure 2/2 multifocal pneumonia (posterior RUL and posterior LLL)  - Legionella, strep pneumo urine antigens, and procalcitonin pending  - Continue Rocephin 2 g IV daily until 10/27 and Zithromax 500  mg p.o. until 10/25  - Wean O2 as tolerated, maintain SpO2 >92%  - Encourage incentive spirometry use  - Duonebs q2h prn  - Tessalon Perles every 8 hours as needed for coughs  - Will monitor v/s   - Will follow INR    #IDDM II  -On Lispro sliding scale #2  -On glargine 50 units at home, held for now as she's slightly hyperglycemic at 125  -Monitor morning CMP and order Lantus as needed     #A-fib on Coumadin  - Warfarin on hold  - Will follow INR     CHRONIC PROBLEMS:  # HTN  # HLD  # GERD  - Will Continue home medications as appropriate        Global Plan of Care  Fluid: As needed  Nutrition: Consistent carb   DVT Prophylaxis: Warfarin on hold because of high INR  GI Prophylaxis: None  Code Status: Full Code     Emergency Contact: Extended Emergency Contact Information  Primary Emergency Contact: DANICA CALDERON  Mobile Phone: [phone]  Relation: Daughter  Preferred language: English   needed? No     Patient seen and discussed with the attending.  Signature: Natalio Figueroa MD,                 PGY I Internal medicine Resident  Date: October 25, 2024   This note has been transcribed using Dragon voice recognition system and there is a possibility of unintentional typing misprints.  Any information found to be copied from previous providers is done in the best interest of the patient to provide accurate, quality, and continuity of care.

## 2024-10-25 NOTE — PROGRESS NOTES
Occupational Therapy    OT Treatment    Patient Name: Joana Morales  MRN: [id number]  Today's Date: 10/25/2024  Time Calculation  Start Time: 1005  Stop Time: 1030  Time Calculation (min): 25 min       3116/3116-A    Assessment:  OT Assessment: Patient continues to demonstrate decreased in dependence in her self care and functional mobility and could benefit from continued O.T. skilled services prior to returning home.  End of Session Communication: Bedside nurse  End of Session Patient Position: Up in chair, Alarm on       Plan:  OT Frequency: 3 times per week     Subjective     Current Problem:  Patient Active Problem List   Diagnosis    Facility-acquired pneumonia    Pneumonia of both lungs due to infectious organism, unspecified part of lung       General:  OT Received On: 10/25/24  Reason for Referral: P/w c/o coughs. Pt admitted for facility acquired PNA.  Referred By: Dr. Eldridge  Prior to Session Communication: Bedside nurse  Patient Position Received: Bed, 3 rail up  General Comment: patient agreeable to working with therapy.    Vital Signs:       Pain:  Pain Assessment  Pain Assessment:  (No voiced concerns)  Objective      Activities of Daily Living:    Grooming  Grooming Level of Assistance: Minimum assistance  Grooming Comments: Walk to sink using wheeled walker for standing ADL task.  Cues and some assist to safely position walker at the sink. Patient stood for hand washing.   Patient continued to appear confused at times.  Patient able to follow instruction.                   Functional Standing Tolerance:       Bed Mobility/Transfers: Bed Mobility  Bed Mobility: Yes  Bed Mobility 1  Bed Mobility 1: Supine to sitting  Level of Assistance 1: Minimum assistance  Transfers  Transfer: Yes  Transfer 1  Transfer From 1: Sit to  Transfer to 1: Stand  Transfer Device 1: Walker, Gait belt  Transfer Level of Assistance 1: Contact guard  Trials/Comments 1: When finished at sink patient transferred to the recliner.  Alarm set.                Therapy/Activity:               Strength:       Other Activity:       Outcome Measures:[facility] Daily Activity  Putting on and taking off regular lower body clothing: A lot  Bathing (including washing, rinsing, drying): A lot  Putting on and taking off regular upper body clothing: A little  Toileting, which includes using toilet, bedpan or urinal: A little  Taking care of personal grooming such as brushing teeth: A little  Eating Meals: None  Daily Activity - Total Score: 17  Education Documentation  No documentation found.  Education Comments  No comments found.           EDUCATION:  Education  Individual(s) Educated: Patient  Education Provided: Fall precautons, Risk and benefits of OT discussed with patient or other  Plan of Care Discussed and Agreed Upon: yes  Patient Response to Education: Patient/Caregiver Verbalized Understanding of Information    Goals:  Encounter Problems       Encounter Problems (Active)       OT Goals       Patient will complete functional transfers with mod I. (Progressing)       Start:  10/24/24    Expected End:  11/07/24            Patient will complete LE dressing with mod I. (Progressing)       Start:  10/24/24    Expected End:  11/07/24            Patient will complete toileting with mod I. (Progressing)       Start:  10/24/24    Expected End:  11/07/24

## 2024-10-26 ENCOUNTER — APPOINTMENT (OUTPATIENT)
Dept: RADIOLOGY | Facility: HOSPITAL | Age: 84
DRG: 193 | End: 2024-10-26
Payer: MEDICARE

## 2024-10-26 PROBLEM — R53.1 WEAKNESS: Status: ACTIVE | Noted: 2024-10-26

## 2024-10-26 PROBLEM — R79.1 ABNORMAL INR: Status: ACTIVE | Noted: 2024-10-26

## 2024-10-26 LAB
ALBUMIN SERPL BCP-MCNC: 3.5 G/DL (ref 3.4–5)
ANION GAP SERPL CALC-SCNC: 11 MMOL/L (ref 10–20)
BUN SERPL-MCNC: 24 MG/DL (ref 6–23)
CALCIUM SERPL-MCNC: 9.2 MG/DL (ref 8.6–10.3)
CHLORIDE SERPL-SCNC: 101 MMOL/L (ref 98–107)
CO2 SERPL-SCNC: 32 MMOL/L (ref 21–32)
CREAT SERPL-MCNC: 0.72 MG/DL (ref 0.5–1.05)
EGFRCR SERPLBLD CKD-EPI 2021: 82 ML/MIN/1.73M*2
ERYTHROCYTE [DISTWIDTH] IN BLOOD BY AUTOMATED COUNT: 14.8 % (ref 11.5–14.5)
GLUCOSE BLD MANUAL STRIP-MCNC: 100 MG/DL (ref 74–99)
GLUCOSE BLD MANUAL STRIP-MCNC: 103 MG/DL (ref 74–99)
GLUCOSE BLD MANUAL STRIP-MCNC: 250 MG/DL (ref 74–99)
GLUCOSE BLD MANUAL STRIP-MCNC: 83 MG/DL (ref 74–99)
GLUCOSE SERPL-MCNC: 101 MG/DL (ref 74–99)
HCT VFR BLD AUTO: 44.5 % (ref 36–46)
HGB BLD-MCNC: 14.1 G/DL (ref 12–16)
INR PPP: 3.8 (ref 0.9–1.1)
MAGNESIUM SERPL-MCNC: 1.86 MG/DL (ref 1.6–2.4)
MCH RBC QN AUTO: 26.5 PG (ref 26–34)
MCHC RBC AUTO-ENTMCNC: 31.7 G/DL (ref 32–36)
MCV RBC AUTO: 84 FL (ref 80–100)
NRBC BLD-RTO: 0 /100 WBCS (ref 0–0)
PHOSPHATE SERPL-MCNC: 2.8 MG/DL (ref 2.5–4.9)
PLATELET # BLD AUTO: 269 X10*3/UL (ref 150–450)
POTASSIUM SERPL-SCNC: 3.5 MMOL/L (ref 3.5–5.3)
PROTHROMBIN TIME: 43.2 SECONDS (ref 9.8–12.8)
RBC # BLD AUTO: 5.32 X10*6/UL (ref 4–5.2)
SODIUM SERPL-SCNC: 140 MMOL/L (ref 136–145)
WBC # BLD AUTO: 8.8 X10*3/UL (ref 4.4–11.3)

## 2024-10-26 PROCEDURE — 36415 COLL VENOUS BLD VENIPUNCTURE: CPT

## 2024-10-26 PROCEDURE — 2500000005 HC RX 250 GENERAL PHARMACY W/O HCPCS

## 2024-10-26 PROCEDURE — 80069 RENAL FUNCTION PANEL: CPT

## 2024-10-26 PROCEDURE — 71045 X-RAY EXAM CHEST 1 VIEW: CPT | Performed by: RADIOLOGY

## 2024-10-26 PROCEDURE — 99233 SBSQ HOSP IP/OBS HIGH 50: CPT

## 2024-10-26 PROCEDURE — 83735 ASSAY OF MAGNESIUM: CPT

## 2024-10-26 PROCEDURE — 82947 ASSAY GLUCOSE BLOOD QUANT: CPT

## 2024-10-26 PROCEDURE — 85610 PROTHROMBIN TIME: CPT

## 2024-10-26 PROCEDURE — 1100000001 HC PRIVATE ROOM DAILY

## 2024-10-26 PROCEDURE — 2500000002 HC RX 250 W HCPCS SELF ADMINISTERED DRUGS (ALT 637 FOR MEDICARE OP, ALT 636 FOR OP/ED)

## 2024-10-26 PROCEDURE — 94640 AIRWAY INHALATION TREATMENT: CPT

## 2024-10-26 PROCEDURE — 2500000001 HC RX 250 WO HCPCS SELF ADMINISTERED DRUGS (ALT 637 FOR MEDICARE OP)

## 2024-10-26 PROCEDURE — 2500000004 HC RX 250 GENERAL PHARMACY W/ HCPCS (ALT 636 FOR OP/ED)

## 2024-10-26 PROCEDURE — 71045 X-RAY EXAM CHEST 1 VIEW: CPT

## 2024-10-26 PROCEDURE — 85027 COMPLETE CBC AUTOMATED: CPT

## 2024-10-26 RX ORDER — QUETIAPINE FUMARATE 25 MG/1
12.5 TABLET, FILM COATED ORAL NIGHTLY PRN
Status: DISCONTINUED | OUTPATIENT
Start: 2024-10-26 | End: 2024-10-29 | Stop reason: HOSPADM

## 2024-10-26 RX ORDER — MAGNESIUM SULFATE HEPTAHYDRATE 40 MG/ML
2 INJECTION, SOLUTION INTRAVENOUS ONCE
Status: COMPLETED | OUTPATIENT
Start: 2024-10-26 | End: 2024-10-26

## 2024-10-26 RX ORDER — AMOXICILLIN AND CLAVULANATE POTASSIUM 875; 125 MG/1; MG/1
1 TABLET, FILM COATED ORAL 2 TIMES DAILY
Qty: 6 TABLET | Refills: 0 | Status: SHIPPED | OUTPATIENT
Start: 2024-10-26 | End: 2024-10-28

## 2024-10-26 ASSESSMENT — COGNITIVE AND FUNCTIONAL STATUS - GENERAL
DRESSING REGULAR LOWER BODY CLOTHING: A LOT
MOVING TO AND FROM BED TO CHAIR: A LOT
PERSONAL GROOMING: A LOT
WALKING IN HOSPITAL ROOM: A LOT
TURNING FROM BACK TO SIDE WHILE IN FLAT BAD: A LOT
CLIMB 3 TO 5 STEPS WITH RAILING: A LOT
STANDING UP FROM CHAIR USING ARMS: A LOT
HELP NEEDED FOR BATHING: A LOT
DAILY ACTIVITIY SCORE: 12
MOVING FROM LYING ON BACK TO SITTING ON SIDE OF FLAT BED WITH BEDRAILS: A LOT
DRESSING REGULAR UPPER BODY CLOTHING: A LOT
TOILETING: A LOT
EATING MEALS: A LOT
MOBILITY SCORE: 12

## 2024-10-26 ASSESSMENT — PAIN SCALES - GENERAL: PAINLEVEL_OUTOF10: 0 - NO PAIN

## 2024-10-26 NOTE — PROGRESS NOTES
"ID:  Joana Morales is a 84 y.o. female on hospital day 2 of admission presenting with Facility-acquired pneumonia.    Subjective   The patient seen and examined this morning at bedside. No overnight events. The patient denied chest pain, fever, nausea, vomiting, diarrhea, headache, blurring of vision. Warfarin is held because of high INR (4.8->3.8) otherwise patient has no active site bleeding.        Objective     Visit Vitals  /81 (BP Location: Left arm, Patient Position: Sitting)   Pulse 69   Temp 36 °C (96.8 °F) (Temporal)   Resp 16   Ht 1.651 m (5' 5\")   Wt 79.4 kg (175 lb)   SpO2 92%   BMI 29.12 kg/m²   Smoking Status Never   BSA 1.91 m²        Physical Examination:    General:  alert, coopertive, not in acute distress, resting comfortably in bed upon examination   HEENT: Normocephalic, atraumatic, EOMI, moist mucous membranes  Neck: Neck supple, trachea midline, no evidence of trauma  Cardiovascular:  S1 & S2 appreciated, no murmurs, rubs, or gallops appreciated  Respiratory:  no respiratory distress, no wheezing, rales or rhonchi  Abdomen: Soft, nondistended, nontender to palpation, +bowel sounds, no guarding rigidity or rebound tenderness  MSK: No joint swelling, normal movements of all extremities.   Neuro: No focal deficits, normal motor function, normal sensation, follows all commands.   Skin: Warm and dry, no lesions, contusions, or erythema.  Psychiatric: Judgment intact.  Appropriate mood, affect and behavior.  Laboratory Data:      Results for orders placed or performed during the hospital encounter of 10/23/24 (from the past 96 hours)   CBC with Differential   Result Value Ref Range    WBC 13.1 (H) 4.4 - 11.3 x10*3/uL    nRBC 0.0 0.0 - 0.0 /100 WBCs    RBC 5.13 4.00 - 5.20 x10*6/uL    Hemoglobin 13.6 12.0 - 16.0 g/dL    Hematocrit 42.9 36.0 - 46.0 %    MCV 84 80 - 100 fL    MCH 26.5 26.0 - 34.0 pg    MCHC 31.7 (L) 32.0 - 36.0 g/dL    RDW 14.9 (H) 11.5 - 14.5 %    Platelets 194 150 - 450 " x10*3/uL    Neutrophils % 82.4 40.0 - 80.0 %    Immature Granulocytes %, Automated 0.3 0.0 - 0.9 %    Lymphocytes % 7.6 13.0 - 44.0 %    Monocytes % 8.5 2.0 - 10.0 %    Eosinophils % 0.9 0.0 - 6.0 %    Basophils % 0.3 0.0 - 2.0 %    Neutrophils Absolute 10.79 (H) 1.60 - 5.50 x10*3/uL    Immature Granulocytes Absolute, Automated 0.04 0.00 - 0.50 x10*3/uL    Lymphocytes Absolute 1.00 0.80 - 3.00 x10*3/uL    Monocytes Absolute 1.11 (H) 0.05 - 0.80 x10*3/uL    Eosinophils Absolute 0.12 0.00 - 0.40 x10*3/uL    Basophils Absolute 0.04 0.00 - 0.10 x10*3/uL   Comprehensive Metabolic Panel   Result Value Ref Range    Glucose 125 (H) 74 - 99 mg/dL    Sodium 139 136 - 145 mmol/L    Potassium 3.8 3.5 - 5.3 mmol/L    Chloride 100 98 - 107 mmol/L    Bicarbonate 29 21 - 32 mmol/L    Anion Gap 14 mmol/L    Urea Nitrogen 17 6 - 23 mg/dL    Creatinine 0.80 0.50 - 1.05 mg/dL    eGFR 72 >60 mL/min/1.73m*2    Calcium 9.2 8.6 - 10.3 mg/dL    Albumin 3.7 3.4 - 5.0 g/dL    Alkaline Phosphatase 52 33 - 136 U/L    Total Protein 6.5 6.4 - 8.2 g/dL    AST 9 9 - 39 U/L    Bilirubin, Total 0.8 0.0 - 1.2 mg/dL    ALT 7 7 - 45 U/L   Brain Natriuretic Peptide   Result Value Ref Range     (H) 0 - 99 pg/mL   Troponin I, High Sensitivity, Initial   Result Value Ref Range    Troponin I, High Sensitivity 11 0 - 13 ng/L   Light Blue Top   Result Value Ref Range    Extra Tube Hold for add-ons.    ECG 12 lead   Result Value Ref Range    Ventricular Rate 71 BPM    Atrial Rate 77 BPM    QRS Duration 102 ms    QT Interval 408 ms    QTC Calculation(Bazett) 443 ms    R Axis 43 degrees    T Axis 246 degrees    QRS Count 11 beats    Q Onset 215 ms    T Offset 419 ms    QTC Fredericia 431 ms   Influenza A, and B PCR   Result Value Ref Range    Flu A Result Not Detected Not Detected    Flu B Result Not Detected Not Detected   Sars-CoV-2 PCR   Result Value Ref Range    Coronavirus 2019, PCR Not Detected Not Detected   Troponin, High Sensitivity, 1 Hour    Result Value Ref Range    Troponin I, High Sensitivity 11 0 - 13 ng/L   Procalcitonin   Result Value Ref Range    Procalcitonin 0.10 (H) <=0.07 ng/mL   POCT GLUCOSE   Result Value Ref Range    POCT Glucose 365 (H) 74 - 99 mg/dL   CBC   Result Value Ref Range    WBC 10.7 4.4 - 11.3 x10*3/uL    nRBC 0.0 0.0 - 0.0 /100 WBCs    RBC 4.90 4.00 - 5.20 x10*6/uL    Hemoglobin 12.8 12.0 - 16.0 g/dL    Hematocrit 41.9 36.0 - 46.0 %    MCV 86 80 - 100 fL    MCH 26.1 26.0 - 34.0 pg    MCHC 30.5 (L) 32.0 - 36.0 g/dL    RDW 14.6 (H) 11.5 - 14.5 %    Platelets 193 150 - 450 x10*3/uL   Magnesium   Result Value Ref Range    Magnesium 2.08 1.60 - 2.40 mg/dL   Renal Function Panel   Result Value Ref Range    Glucose 475 (HH) 74 - 99 mg/dL    Sodium 132 (L) 136 - 145 mmol/L    Potassium 4.6 3.5 - 5.3 mmol/L    Chloride 95 (L) 98 - 107 mmol/L    Bicarbonate 24 21 - 32 mmol/L    Anion Gap 18 10 - 20 mmol/L    Urea Nitrogen 23 6 - 23 mg/dL    Creatinine 0.79 0.50 - 1.05 mg/dL    eGFR 73 >60 mL/min/1.73m*2    Calcium 8.6 8.6 - 10.3 mg/dL    Phosphorus 3.7 2.5 - 4.9 mg/dL    Albumin 3.4 3.4 - 5.0 g/dL   Protime-INR   Result Value Ref Range    Protime 32.7 (H) 9.8 - 12.8 seconds    INR 2.9 (H) 0.9 - 1.1   POCT GLUCOSE   Result Value Ref Range    POCT Glucose 461 (H) 74 - 99 mg/dL   POCT GLUCOSE   Result Value Ref Range    POCT Glucose 480 (H) 74 - 99 mg/dL   POCT GLUCOSE   Result Value Ref Range    POCT Glucose 423 (H) 74 - 99 mg/dL   POCT GLUCOSE   Result Value Ref Range    POCT Glucose 432 (H) 74 - 99 mg/dL   POCT GLUCOSE   Result Value Ref Range    POCT Glucose 366 (H) 74 - 99 mg/dL   POCT GLUCOSE   Result Value Ref Range    POCT Glucose 329 (H) 74 - 99 mg/dL   Legionella Antigen, Urine    Specimen: Urine   Result Value Ref Range    L. pneumophila Urine Ag Negative Negative   Streptococcus pneumoniae Antigen, Urine    Specimen: Urine   Result Value Ref Range    Streptococcus pneumoniae Ag, Urine Negative Negative   POCT GLUCOSE   Result  Value Ref Range    POCT Glucose 439 (H) 74 - 99 mg/dL   POCT GLUCOSE   Result Value Ref Range    POCT Glucose 397 (H) 74 - 99 mg/dL   CBC   Result Value Ref Range    WBC 11.6 (H) 4.4 - 11.3 x10*3/uL    nRBC 0.0 0.0 - 0.0 /100 WBCs    RBC 4.90 4.00 - 5.20 x10*6/uL    Hemoglobin 13.1 12.0 - 16.0 g/dL    Hematocrit 41.7 36.0 - 46.0 %    MCV 85 80 - 100 fL    MCH 26.7 26.0 - 34.0 pg    MCHC 31.4 (L) 32.0 - 36.0 g/dL    RDW 14.6 (H) 11.5 - 14.5 %    Platelets 216 150 - 450 x10*3/uL   Magnesium   Result Value Ref Range    Magnesium 2.08 1.60 - 2.40 mg/dL   Renal Function Panel   Result Value Ref Range    Glucose 363 (H) 74 - 99 mg/dL    Sodium 132 (L) 136 - 145 mmol/L    Potassium 4.0 3.5 - 5.3 mmol/L    Chloride 97 (L) 98 - 107 mmol/L    Bicarbonate 27 21 - 32 mmol/L    Anion Gap 12 10 - 20 mmol/L    Urea Nitrogen 34 (H) 6 - 23 mg/dL    Creatinine 0.76 0.50 - 1.05 mg/dL    eGFR 76 >60 mL/min/1.73m*2    Calcium 8.6 8.6 - 10.3 mg/dL    Phosphorus 3.0 2.5 - 4.9 mg/dL    Albumin 3.2 (L) 3.4 - 5.0 g/dL   Protime-INR   Result Value Ref Range    Protime 55.4 (HH) 9.8 - 12.8 seconds    INR 4.8 (H) 0.9 - 1.1   POCT GLUCOSE   Result Value Ref Range    POCT Glucose 373 (H) 74 - 99 mg/dL   POCT GLUCOSE   Result Value Ref Range    POCT Glucose 429 (H) 74 - 99 mg/dL   POCT GLUCOSE   Result Value Ref Range    POCT Glucose 325 (H) 74 - 99 mg/dL   POCT GLUCOSE   Result Value Ref Range    POCT Glucose 210 (H) 74 - 99 mg/dL   POCT GLUCOSE   Result Value Ref Range    POCT Glucose 98 74 - 99 mg/dL   POCT GLUCOSE   Result Value Ref Range    POCT Glucose 101 (H) 74 - 99 mg/dL   CBC   Result Value Ref Range    WBC 8.8 4.4 - 11.3 x10*3/uL    nRBC 0.0 0.0 - 0.0 /100 WBCs    RBC 5.32 (H) 4.00 - 5.20 x10*6/uL    Hemoglobin 14.1 12.0 - 16.0 g/dL    Hematocrit 44.5 36.0 - 46.0 %    MCV 84 80 - 100 fL    MCH 26.5 26.0 - 34.0 pg    MCHC 31.7 (L) 32.0 - 36.0 g/dL    RDW 14.8 (H) 11.5 - 14.5 %    Platelets 269 150 - 450 x10*3/uL   Magnesium   Result  Value Ref Range    Magnesium 1.86 1.60 - 2.40 mg/dL   Renal Function Panel   Result Value Ref Range    Glucose 101 (H) 74 - 99 mg/dL    Sodium 140 136 - 145 mmol/L    Potassium 3.5 3.5 - 5.3 mmol/L    Chloride 101 98 - 107 mmol/L    Bicarbonate 32 21 - 32 mmol/L    Anion Gap 11 10 - 20 mmol/L    Urea Nitrogen 24 (H) 6 - 23 mg/dL    Creatinine 0.72 0.50 - 1.05 mg/dL    eGFR 82 >60 mL/min/1.73m*2    Calcium 9.2 8.6 - 10.3 mg/dL    Phosphorus 2.8 2.5 - 4.9 mg/dL    Albumin 3.5 3.4 - 5.0 g/dL   Protime-INR   Result Value Ref Range    Protime 43.2 (H) 9.8 - 12.8 seconds    INR 3.8 (H) 0.9 - 1.1   POCT GLUCOSE   Result Value Ref Range    POCT Glucose 103 (H) 74 - 99 mg/dL   POCT GLUCOSE   Result Value Ref Range    POCT Glucose 250 (H) 74 - 99 mg/dL        Imaging:  XR chest 1 view    Result Date: 10/26/2024  Interpreted By:  Claudette Vázquez, STUDY: XR CHEST 1 VIEW;  10/26/2024 2:24 pm   INDICATION: Signs/Symptoms:COPD treated for pneumonia? pulmonary edema.     COMPARISON: Chest x-ray 10/22/2024. CT chest 10/23/2024.   ACCESSION NUMBER(S): [id number]   ORDERING CLINICIAN: ISRA GARCIA   FINDINGS: AP radiograph of the chest was provided.       CARDIOMEDIASTINAL SILHOUETTE: Stable mediastinal contours with tortuous thoracic aorta and cardiomegaly.   LUNGS: Central hilar vasculature and interstitial lung markings are within normal limits. There are subtle hazy opacities in the right infrahilar and left retrocardiac regions, consistent with multifocal pneumonia and atelectasis seen on recent chest CT. No new focal area of consolidation or airspace opacities otherwise seen. No pleural effusion or pneumothorax.   ABDOMEN: No remarkable upper abdominal findings.   BONES: No acute osseous changes.       1. Hazy opacities in the right infrahilar and left retrocardiac regions are consistent with multifocal pneumonia and atelectasis seen on recent chest CT. 2. Cardiomegaly.       MACRO: None   Signed by: Claudette Vázquez 10/26/2024  2:33 PM Dictation workstation:   [id number]      Medications:  Scheduled medications  amLODIPine, 10 mg, oral, Daily  buPROPion SR, 100 mg, oral, BID  cefTRIAXone, 2 g, intravenous, q24h  citalopram, 20 mg, oral, Daily  digoxin, 125 mcg, oral, Daily  insulin glargine, 50 Units, subcutaneous, q24h  insulin lispro, 0-10 Units, subcutaneous, Before meals & nightly  insulin lispro, 7 Units, subcutaneous, TID  ipratropium-albuteroL, 3 mL, nebulization, TID  losartan, 25 mg, oral, Daily  magnesium oxide, 400 mg, oral, Daily  melatonin, 10 mg, oral, Nightly  metoprolol succinate XL, 100 mg, oral, BID  pantoprazole, 40 mg, oral, Daily before breakfast  polyethylene glycol, 17 g, oral, Daily  simvastatin, 20 mg, oral, Nightly  traZODone, 50 mg, oral, Nightly  [Held by provider] warfarin, 4 mg, oral, Once per day on Monday Wednesday Friday  [Held by provider] warfarin, 7.5 mg, oral, Once per day on Sunday Tuesday Thursday Saturday      Continuous medications     PRN medications  PRN medications: benzonatate, dextrose, dextrose, glucagon, glucagon, ipratropium-albuteroL, oxygen    Assessment    Assessment & Plan   Ms. Joana Morales is a 84 y.o. female who presents with a primary complaint of dry cough whose PMH is significant for COPD on nocturnal 2L NC, IDDM II, paroxsymal afib on Coumadin, HTN, HLD, and GERD.  In the emergency room, patient was hemodynamically stable but required 4 L NC compared to her home nocturnal 2 L NC.  Labs in the ED remarkable for leukocytosis of 13.  CT angio chest showed multifocal pneumonia in the posterior RUL and posterior LLL.  Patient treated with Rocephin and azithromycin.     #Acute on chronic hypoxic respiratory failure 2/2 multifocal pneumonia (posterior RUL and posterior LLL)  - Legionella, strep pneumo urine antigens are negative  - Continue Rocephin 2 g IV daily until 10/27 and Zithromax 500 mg p.o. until 10/25  - Wean O2 as tolerated, maintain SpO2 >92%  - Encourage incentive  spirometry use  - Duonebs q2h prn  - Tessalon Perles every 8 hours as needed for coughs  - Will monitor v/s   - Will follow INR    #IDDM II  -On Lispro sliding scale #2  -On glargine 50 units at home  -Monitor morning CMP and order Lantus as needed     #A-fib on Coumadin  - Warfarin on hold  - Will follow INR     CHRONIC PROBLEMS:  # HTN  # HLD  # GERD  - Will Continue home medications as appropriate        Global Plan of Care  Fluid: As needed  Nutrition: Consistent carb   DVT Prophylaxis: Warfarin on hold because of high INR  GI Prophylaxis: None  Code Status: Full Code     Emergency Contact: Extended Emergency Contact Information  Primary Emergency Contact: RACHEL CALDERONA  Mobile Phone: [phone]  Relation: Daughter  Preferred language: English   needed? No     Patient seen and discussed with the attending.  Signature: Natalio Figueroa MD,                 PGY I Internal medicine Resident  Date: October 26, 2024   This note has been transcribed using Dragon voice recognition system and there is a possibility of unintentional typing misprints.  Any information found to be copied from previous providers is done in the best interest of the patient to provide accurate, quality, and continuity of care.

## 2024-10-26 NOTE — DISCHARGE SUMMARY
Discharge Diagnosis  Pneumonia    Issues Requiring Follow-Up  Repeat INR on 10/31/2024      Discharge Meds     Medication List      START taking these medications     amoxicillin-pot clavulanate 875-125 mg tablet; Commonly known as:   Augmentin; Take 1 tablet by mouth 2 times a day for 1 day.     CONTINUE taking these medications     alendronate 70 mg tablet; Commonly known as: Fosamax   amLODIPine 10 mg tablet; Commonly known as: Norvasc   Basaglar KwikPen U-100 Insulin 100 unit/mL (3 mL) pen; Generic drug:   insulin glargine   buPROPion  mg 12 hr tablet; Commonly known as: Wellbutrin SR   citalopram 20 mg tablet; Commonly known as: CeleXA   coenzyme Q-10 100 mg capsule   Colace 100 mg capsule; Generic drug: docusate sodium   digoxin 125 MCG tablet; Commonly known as: Lanoxin   ferrous sulfate (325 mg ferrous sulfate) tablet   furosemide 80 mg tablet; Commonly known as: Lasix   HumaLOG KwikPen Insulin 100 unit/mL injection; Generic drug: insulin   lispro   losartan 25 mg tablet; Commonly known as: Cozaar   magnesium oxide 400 mg (241.3 mg magnesium) tablet; Commonly known as:   Mag-Ox   melatonin 5 mg tablet   metFORMIN 500 mg tablet; Commonly known as: Glucophage   metoprolol succinate  mg 24 hr tablet; Commonly known as:   Toprol-XL   omeprazole 40 mg DR capsule; Commonly known as: PriLOSEC   simvastatin 20 mg tablet; Commonly known as: Zocor   traZODone 50 mg tablet; Commonly known as: Desyrel   * warfarin 4 mg tablet; Commonly known as: Coumadin   * warfarin 7.5 mg tablet; Commonly known as: Coumadin  * This list has 2 medication(s) that are the same as other medications   prescribed for you. Read the directions carefully, and ask your doctor or   other care provider to review them with you.     STOP taking these medications     glipiZIDE 5 mg tablet; Commonly known as: Glucotrol   ibuprofen 600 mg tablet   Januvia 50 mg tablet; Generic drug: SITagliptin phosphate   potassium chloride CR 20 mEq ER  tablet; Commonly known as: Klor-Con M20       Test Results Pending At Discharge  Pending Labs       No current pending labs.            Hospital Course  Ms. Joana Morales is a 84 y.o. female with a primary complaint of coughs whose PMH is significant for COPD on nocturnal 2L NC, IDDM II, paroxsymal afib on Coumadin, HTN, HLD, and GERD.  Patient reports she has been having dry coughs for the past few days.  She denies any known sick contacts, shortness of breath, fever, or chills.  Upon review of ambulance record, patient is normally on nocturnal 2 L nasal cannula but staff administered 2 L nasal cannula during the day due to low SpO2.  Patient's pulse ox was 84 to 88% on 2 L nasal cannula so EMS was called by staff.  Patient currently endorses nonproductive cough.  She reports improvement after receiving DuoNeb breathing treatment in the emergency room. On the floor patient is treated with IV rocephin and po azithromycin. The antibiotics is switched to po Augmentin to complete full course and its prescribed for 01 day to complete full course. Warfarin was on hold because of high INR ( 4.8->3.8) and the high INR was attributed to be because of the antibiotics she was taking for the pneumonia. INR came down to 2.7 on 10/27 2.7 then to 2.2 on 10/28. Warfarin restarted on 10/27/24.  Patient is discharged in stable condition.    Pertinent Physical Exam At Time of Discharge  Physical Exam  General:  alert, coopertive, not in acute distress, resting comfortably in bed upon examination   HEENT: Normocephalic, atraumatic, EOMI, moist mucous membranes  Neck: Neck supple, trachea midline, no evidence of trauma  Cardiovascular:  S1 & S2 appreciated, no murmurs, rubs, or gallops appreciated  Respiratory:  no respiratory distress, no wheezing, rales or rhonchi  Abdomen: Soft, nondistended, nontender to palpation  MSK: No joint swelling, normal movements of all extremities.   Neuro: No focal deficits, normal motor function, normal  sensation, follows all commands.   Skin: Warm and dry, no lesions, contusions, or erythema.  Psychiatric: Judgment intact.  Appropriate mood, affect and behavior.  Outpatient Follow-Up  No future appointments.      Natalio Figueroa MD  Internal Medicine PGY I Resident

## 2024-10-26 NOTE — DISCHARGE INSTRUCTIONS
Please follow up with your primary care provider within 7 days for hospital follow up. Please call to make this appointment.   You were admitted with pneumonia and treated with antibiotics  On 10/31/2024 please do lab work and check INR.  Order has been placed already for the INR.    Medication stopped  - Glipizide  - Januvia  - Iboprofen  - Potassium chloride  - short acting mealtime insulin    Medication changes  - Losartan increased from 25mg to 50mg  - Lantus decreased from 50 units to 25 units at night    Please take your medications as prescribed.     If you have any new or worsening symptoms seek medical attention.    Thank you for allowing us to participate in your care!    -[facility].

## 2024-10-26 NOTE — PROGRESS NOTES
Placed call to patient's daughter per her request. Lucia explained patient changed her health insurance without family knowing and she can no longer see her PCP. Lucia did contact an NP through  and they were supposed to call her yesterday to advise if they can see patient but they did not call. She plans to follow up with them Monday. Also discussed long term care. She feels patient may not be able to manage in [place] much longer. Encouraged her to talk to  at [facility] and they can help with the transition. She said she has already contacted a few long term care facilities. She was thankful for call and plans to reach out to the NP Monday.   RN and physician updated.    [id number]  Met with patient's daughter and her cousins. [facility] told daughter they will not take patient back at this time and she needs skilled facility. Explained that therapy not recommending skilled level of care so insurance will likely not approve but we can try to get precert. Discussed backup pan of trying to get somewhere long term care under medicaid pending as they are trying to reinstate her medicaid. Daughter advised someone from [facility] did see her in sept to assist with medicaid process. Lucia was agreeable to skilled referrals being sent to [facility], [place], [facility] and [facility] Asked [facility] to send referrals.

## 2024-10-27 VITALS
HEART RATE: 73 BPM | TEMPERATURE: 97.5 F | WEIGHT: 175 LBS | DIASTOLIC BLOOD PRESSURE: 88 MMHG | OXYGEN SATURATION: 97 % | RESPIRATION RATE: 19 BRPM | SYSTOLIC BLOOD PRESSURE: 188 MMHG | HEIGHT: 65 IN | BODY MASS INDEX: 29.16 KG/M2

## 2024-10-27 LAB
GLUCOSE BLD MANUAL STRIP-MCNC: 133 MG/DL (ref 74–99)
GLUCOSE BLD MANUAL STRIP-MCNC: 176 MG/DL (ref 74–99)
GLUCOSE BLD MANUAL STRIP-MCNC: 177 MG/DL (ref 74–99)
GLUCOSE BLD MANUAL STRIP-MCNC: 255 MG/DL (ref 74–99)
GLUCOSE BLD MANUAL STRIP-MCNC: 56 MG/DL (ref 74–99)
GLUCOSE BLD MANUAL STRIP-MCNC: 69 MG/DL (ref 74–99)
HOLD SPECIMEN: NORMAL
HOLD SPECIMEN: NORMAL
INR PPP: 2.7 (ref 0.9–1.1)
PROTHROMBIN TIME: 30.5 SECONDS (ref 9.8–12.8)

## 2024-10-27 PROCEDURE — 2500000001 HC RX 250 WO HCPCS SELF ADMINISTERED DRUGS (ALT 637 FOR MEDICARE OP)

## 2024-10-27 PROCEDURE — 85610 PROTHROMBIN TIME: CPT

## 2024-10-27 PROCEDURE — 2500000002 HC RX 250 W HCPCS SELF ADMINISTERED DRUGS (ALT 637 FOR MEDICARE OP, ALT 636 FOR OP/ED)

## 2024-10-27 PROCEDURE — 82947 ASSAY GLUCOSE BLOOD QUANT: CPT

## 2024-10-27 PROCEDURE — 1100000001 HC PRIVATE ROOM DAILY

## 2024-10-27 PROCEDURE — 2500000005 HC RX 250 GENERAL PHARMACY W/O HCPCS

## 2024-10-27 PROCEDURE — 2500000004 HC RX 250 GENERAL PHARMACY W/ HCPCS (ALT 636 FOR OP/ED)

## 2024-10-27 PROCEDURE — 94640 AIRWAY INHALATION TREATMENT: CPT

## 2024-10-27 PROCEDURE — 36415 COLL VENOUS BLD VENIPUNCTURE: CPT

## 2024-10-27 PROCEDURE — 99232 SBSQ HOSP IP/OBS MODERATE 35: CPT

## 2024-10-27 RX ORDER — WARFARIN 4 MG/1
4 TABLET ORAL DAILY
Status: DISCONTINUED | OUTPATIENT
Start: 2024-10-28 | End: 2024-10-27

## 2024-10-27 RX ORDER — INSULIN GLARGINE 100 [IU]/ML
40 INJECTION, SOLUTION SUBCUTANEOUS EVERY 24 HOURS
Status: DISCONTINUED | OUTPATIENT
Start: 2024-10-28 | End: 2024-10-27

## 2024-10-27 RX ORDER — AMOXICILLIN AND CLAVULANATE POTASSIUM 875; 125 MG/1; MG/1
1 TABLET, FILM COATED ORAL EVERY 12 HOURS SCHEDULED
Status: DISCONTINUED | OUTPATIENT
Start: 2024-10-27 | End: 2024-10-29 | Stop reason: HOSPADM

## 2024-10-27 RX ORDER — INSULIN GLARGINE 100 [IU]/ML
40 INJECTION, SOLUTION SUBCUTANEOUS EVERY 24 HOURS
Status: DISCONTINUED | OUTPATIENT
Start: 2024-10-27 | End: 2024-10-29

## 2024-10-27 ASSESSMENT — PAIN SCALES - GENERAL
PAINLEVEL_OUTOF10: 0 - NO PAIN

## 2024-10-27 ASSESSMENT — COGNITIVE AND FUNCTIONAL STATUS - GENERAL
DAILY ACTIVITIY SCORE: 18
TURNING FROM BACK TO SIDE WHILE IN FLAT BAD: A LITTLE
MOBILITY SCORE: 18
TOILETING: A LITTLE
HELP NEEDED FOR BATHING: A LITTLE
STANDING UP FROM CHAIR USING ARMS: A LITTLE
DRESSING REGULAR UPPER BODY CLOTHING: A LITTLE
EATING MEALS: A LITTLE
PERSONAL GROOMING: A LITTLE
MOVING FROM LYING ON BACK TO SITTING ON SIDE OF FLAT BED WITH BEDRAILS: A LITTLE
DRESSING REGULAR LOWER BODY CLOTHING: A LITTLE
MOVING TO AND FROM BED TO CHAIR: A LITTLE
CLIMB 3 TO 5 STEPS WITH RAILING: A LITTLE
WALKING IN HOSPITAL ROOM: A LITTLE

## 2024-10-27 ASSESSMENT — PAIN - FUNCTIONAL ASSESSMENT
PAIN_FUNCTIONAL_ASSESSMENT: 0-10
PAIN_FUNCTIONAL_ASSESSMENT: 0-10

## 2024-10-27 NOTE — CARE PLAN
The patient's goals for the shift include remain free of falls    The clinical goals for the shift include completed      Problem: Fall/Injury  Goal: Be free from injury by end of the shift  Outcome: Progressing

## 2024-10-27 NOTE — PROGRESS NOTES
"ID:  Joana Morales is a 84 y.o. female on hospital day 3 of admission presenting with Facility-acquired pneumonia.    Subjective   The patient seen and examined this morning at bedside. Patient received one dose of Seroquel for agitation . This morning patient denies chest pain, fever, nausea, vomiting, diarrhea, headache, blurring of vision. Warfarin restarted since INR is down to 2.7        Objective     Visit Vitals  /72 (BP Location: Right arm, Patient Position: Sitting)   Pulse 55   Temp 36 °C (96.8 °F) (Temporal)   Resp 17   Ht 1.651 m (5' 5\")   Wt 79.4 kg (175 lb)   SpO2 93%   BMI 29.12 kg/m²   Smoking Status Never   BSA 1.91 m²        Physical Examination:    General:  alert, not in acute distress, resting comfortably in bed upon examination   HEENT: Normocephalic, atraumatic, EOMI, moist mucous membranes  Neck: Neck supple, trachea midline, no evidence of trauma  Cardiovascular:  S1 & S2 appreciated, no murmurs, rubs, or gallops appreciated  Respiratory:  no respiratory distress, no wheezing, rales or rhonchi  Abdomen: Soft, nondistended, nontender to palpation,no guarding rigidity or rebound tenderness  MSK: No joint swelling, normal movements of all extremities.   Neuro: No focal deficits, normal motor function, normal sensation  Skin: Warm and dry, no lesions, contusions, or erythema.    Laboratory Data:      Results for orders placed or performed during the hospital encounter of 10/23/24 (from the past 96 hours)   CBC with Differential   Result Value Ref Range    WBC 13.1 (H) 4.4 - 11.3 x10*3/uL    nRBC 0.0 0.0 - 0.0 /100 WBCs    RBC 5.13 4.00 - 5.20 x10*6/uL    Hemoglobin 13.6 12.0 - 16.0 g/dL    Hematocrit 42.9 36.0 - 46.0 %    MCV 84 80 - 100 fL    MCH 26.5 26.0 - 34.0 pg    MCHC 31.7 (L) 32.0 - 36.0 g/dL    RDW 14.9 (H) 11.5 - 14.5 %    Platelets 194 150 - 450 x10*3/uL    Neutrophils % 82.4 40.0 - 80.0 %    Immature Granulocytes %, Automated 0.3 0.0 - 0.9 %    Lymphocytes % 7.6 13.0 - 44.0 %    " Monocytes % 8.5 2.0 - 10.0 %    Eosinophils % 0.9 0.0 - 6.0 %    Basophils % 0.3 0.0 - 2.0 %    Neutrophils Absolute 10.79 (H) 1.60 - 5.50 x10*3/uL    Immature Granulocytes Absolute, Automated 0.04 0.00 - 0.50 x10*3/uL    Lymphocytes Absolute 1.00 0.80 - 3.00 x10*3/uL    Monocytes Absolute 1.11 (H) 0.05 - 0.80 x10*3/uL    Eosinophils Absolute 0.12 0.00 - 0.40 x10*3/uL    Basophils Absolute 0.04 0.00 - 0.10 x10*3/uL   Comprehensive Metabolic Panel   Result Value Ref Range    Glucose 125 (H) 74 - 99 mg/dL    Sodium 139 136 - 145 mmol/L    Potassium 3.8 3.5 - 5.3 mmol/L    Chloride 100 98 - 107 mmol/L    Bicarbonate 29 21 - 32 mmol/L    Anion Gap 14 mmol/L    Urea Nitrogen 17 6 - 23 mg/dL    Creatinine 0.80 0.50 - 1.05 mg/dL    eGFR 72 >60 mL/min/1.73m*2    Calcium 9.2 8.6 - 10.3 mg/dL    Albumin 3.7 3.4 - 5.0 g/dL    Alkaline Phosphatase 52 33 - 136 U/L    Total Protein 6.5 6.4 - 8.2 g/dL    AST 9 9 - 39 U/L    Bilirubin, Total 0.8 0.0 - 1.2 mg/dL    ALT 7 7 - 45 U/L   Brain Natriuretic Peptide   Result Value Ref Range     (H) 0 - 99 pg/mL   Troponin I, High Sensitivity, Initial   Result Value Ref Range    Troponin I, High Sensitivity 11 0 - 13 ng/L   Light Blue Top   Result Value Ref Range    Extra Tube Hold for add-ons.    ECG 12 lead   Result Value Ref Range    Ventricular Rate 71 BPM    Atrial Rate 77 BPM    QRS Duration 102 ms    QT Interval 408 ms    QTC Calculation(Bazett) 443 ms    R Axis 43 degrees    T Axis 246 degrees    QRS Count 11 beats    Q Onset 215 ms    T Offset 419 ms    QTC Fredericia 431 ms   Influenza A, and B PCR   Result Value Ref Range    Flu A Result Not Detected Not Detected    Flu B Result Not Detected Not Detected   Sars-CoV-2 PCR   Result Value Ref Range    Coronavirus 2019, PCR Not Detected Not Detected   Troponin, High Sensitivity, 1 Hour   Result Value Ref Range    Troponin I, High Sensitivity 11 0 - 13 ng/L   Procalcitonin   Result Value Ref Range    Procalcitonin 0.10 (H)  <=0.07 ng/mL   POCT GLUCOSE   Result Value Ref Range    POCT Glucose 365 (H) 74 - 99 mg/dL   CBC   Result Value Ref Range    WBC 10.7 4.4 - 11.3 x10*3/uL    nRBC 0.0 0.0 - 0.0 /100 WBCs    RBC 4.90 4.00 - 5.20 x10*6/uL    Hemoglobin 12.8 12.0 - 16.0 g/dL    Hematocrit 41.9 36.0 - 46.0 %    MCV 86 80 - 100 fL    MCH 26.1 26.0 - 34.0 pg    MCHC 30.5 (L) 32.0 - 36.0 g/dL    RDW 14.6 (H) 11.5 - 14.5 %    Platelets 193 150 - 450 x10*3/uL   Magnesium   Result Value Ref Range    Magnesium 2.08 1.60 - 2.40 mg/dL   Renal Function Panel   Result Value Ref Range    Glucose 475 (HH) 74 - 99 mg/dL    Sodium 132 (L) 136 - 145 mmol/L    Potassium 4.6 3.5 - 5.3 mmol/L    Chloride 95 (L) 98 - 107 mmol/L    Bicarbonate 24 21 - 32 mmol/L    Anion Gap 18 10 - 20 mmol/L    Urea Nitrogen 23 6 - 23 mg/dL    Creatinine 0.79 0.50 - 1.05 mg/dL    eGFR 73 >60 mL/min/1.73m*2    Calcium 8.6 8.6 - 10.3 mg/dL    Phosphorus 3.7 2.5 - 4.9 mg/dL    Albumin 3.4 3.4 - 5.0 g/dL   Protime-INR   Result Value Ref Range    Protime 32.7 (H) 9.8 - 12.8 seconds    INR 2.9 (H) 0.9 - 1.1   POCT GLUCOSE   Result Value Ref Range    POCT Glucose 461 (H) 74 - 99 mg/dL   POCT GLUCOSE   Result Value Ref Range    POCT Glucose 480 (H) 74 - 99 mg/dL   POCT GLUCOSE   Result Value Ref Range    POCT Glucose 423 (H) 74 - 99 mg/dL   POCT GLUCOSE   Result Value Ref Range    POCT Glucose 432 (H) 74 - 99 mg/dL   POCT GLUCOSE   Result Value Ref Range    POCT Glucose 366 (H) 74 - 99 mg/dL   POCT GLUCOSE   Result Value Ref Range    POCT Glucose 329 (H) 74 - 99 mg/dL   Legionella Antigen, Urine    Specimen: Urine   Result Value Ref Range    L. pneumophila Urine Ag Negative Negative   Streptococcus pneumoniae Antigen, Urine    Specimen: Urine   Result Value Ref Range    Streptococcus pneumoniae Ag, Urine Negative Negative   POCT GLUCOSE   Result Value Ref Range    POCT Glucose 439 (H) 74 - 99 mg/dL   POCT GLUCOSE   Result Value Ref Range    POCT Glucose 397 (H) 74 - 99 mg/dL   CBC    Result Value Ref Range    WBC 11.6 (H) 4.4 - 11.3 x10*3/uL    nRBC 0.0 0.0 - 0.0 /100 WBCs    RBC 4.90 4.00 - 5.20 x10*6/uL    Hemoglobin 13.1 12.0 - 16.0 g/dL    Hematocrit 41.7 36.0 - 46.0 %    MCV 85 80 - 100 fL    MCH 26.7 26.0 - 34.0 pg    MCHC 31.4 (L) 32.0 - 36.0 g/dL    RDW 14.6 (H) 11.5 - 14.5 %    Platelets 216 150 - 450 x10*3/uL   Magnesium   Result Value Ref Range    Magnesium 2.08 1.60 - 2.40 mg/dL   Renal Function Panel   Result Value Ref Range    Glucose 363 (H) 74 - 99 mg/dL    Sodium 132 (L) 136 - 145 mmol/L    Potassium 4.0 3.5 - 5.3 mmol/L    Chloride 97 (L) 98 - 107 mmol/L    Bicarbonate 27 21 - 32 mmol/L    Anion Gap 12 10 - 20 mmol/L    Urea Nitrogen 34 (H) 6 - 23 mg/dL    Creatinine 0.76 0.50 - 1.05 mg/dL    eGFR 76 >60 mL/min/1.73m*2    Calcium 8.6 8.6 - 10.3 mg/dL    Phosphorus 3.0 2.5 - 4.9 mg/dL    Albumin 3.2 (L) 3.4 - 5.0 g/dL   Protime-INR   Result Value Ref Range    Protime 55.4 (HH) 9.8 - 12.8 seconds    INR 4.8 (H) 0.9 - 1.1   POCT GLUCOSE   Result Value Ref Range    POCT Glucose 373 (H) 74 - 99 mg/dL   POCT GLUCOSE   Result Value Ref Range    POCT Glucose 429 (H) 74 - 99 mg/dL   POCT GLUCOSE   Result Value Ref Range    POCT Glucose 325 (H) 74 - 99 mg/dL   POCT GLUCOSE   Result Value Ref Range    POCT Glucose 210 (H) 74 - 99 mg/dL   POCT GLUCOSE   Result Value Ref Range    POCT Glucose 98 74 - 99 mg/dL   POCT GLUCOSE   Result Value Ref Range    POCT Glucose 101 (H) 74 - 99 mg/dL   CBC   Result Value Ref Range    WBC 8.8 4.4 - 11.3 x10*3/uL    nRBC 0.0 0.0 - 0.0 /100 WBCs    RBC 5.32 (H) 4.00 - 5.20 x10*6/uL    Hemoglobin 14.1 12.0 - 16.0 g/dL    Hematocrit 44.5 36.0 - 46.0 %    MCV 84 80 - 100 fL    MCH 26.5 26.0 - 34.0 pg    MCHC 31.7 (L) 32.0 - 36.0 g/dL    RDW 14.8 (H) 11.5 - 14.5 %    Platelets 269 150 - 450 x10*3/uL   Magnesium   Result Value Ref Range    Magnesium 1.86 1.60 - 2.40 mg/dL   Renal Function Panel   Result Value Ref Range    Glucose 101 (H) 74 - 99 mg/dL    Sodium  140 136 - 145 mmol/L    Potassium 3.5 3.5 - 5.3 mmol/L    Chloride 101 98 - 107 mmol/L    Bicarbonate 32 21 - 32 mmol/L    Anion Gap 11 10 - 20 mmol/L    Urea Nitrogen 24 (H) 6 - 23 mg/dL    Creatinine 0.72 0.50 - 1.05 mg/dL    eGFR 82 >60 mL/min/1.73m*2    Calcium 9.2 8.6 - 10.3 mg/dL    Phosphorus 2.8 2.5 - 4.9 mg/dL    Albumin 3.5 3.4 - 5.0 g/dL   Protime-INR   Result Value Ref Range    Protime 43.2 (H) 9.8 - 12.8 seconds    INR 3.8 (H) 0.9 - 1.1   POCT GLUCOSE   Result Value Ref Range    POCT Glucose 103 (H) 74 - 99 mg/dL   POCT GLUCOSE   Result Value Ref Range    POCT Glucose 250 (H) 74 - 99 mg/dL   POCT GLUCOSE   Result Value Ref Range    POCT Glucose 83 74 - 99 mg/dL   POCT GLUCOSE   Result Value Ref Range    POCT Glucose 100 (H) 74 - 99 mg/dL   Lavender Top   Result Value Ref Range    Extra Tube Hold for add-ons.    PST Top   Result Value Ref Range    Extra Tube Hold for add-ons.    Protime-INR   Result Value Ref Range    Protime 30.5 (H) 9.8 - 12.8 seconds    INR 2.7 (H) 0.9 - 1.1   POCT GLUCOSE   Result Value Ref Range    POCT Glucose 69 (L) 74 - 99 mg/dL   POCT GLUCOSE   Result Value Ref Range    POCT Glucose 176 (H) 74 - 99 mg/dL        Imaging:  XR chest 1 view    Result Date: 10/26/2024  Interpreted By:  Claudette Vázquez, STUDY: XR CHEST 1 VIEW;  10/26/2024 2:24 pm   INDICATION: Signs/Symptoms:COPD treated for pneumonia? pulmonary edema.     COMPARISON: Chest x-ray 10/22/2024. CT chest 10/23/2024.   ACCESSION NUMBER(S): [id number]   ORDERING CLINICIAN: ISRA GARCIA   FINDINGS: AP radiograph of the chest was provided.       CARDIOMEDIASTINAL SILHOUETTE: Stable mediastinal contours with tortuous thoracic aorta and cardiomegaly.   LUNGS: Central hilar vasculature and interstitial lung markings are within normal limits. There are subtle hazy opacities in the right infrahilar and left retrocardiac regions, consistent with multifocal pneumonia and atelectasis seen on recent chest CT. No new focal area of  consolidation or airspace opacities otherwise seen. No pleural effusion or pneumothorax.   ABDOMEN: No remarkable upper abdominal findings.   BONES: No acute osseous changes.       1. Hazy opacities in the right infrahilar and left retrocardiac regions are consistent with multifocal pneumonia and atelectasis seen on recent chest CT. 2. Cardiomegaly.       MACRO: None   Signed by: Claudette Vázquez 10/26/2024 2:33 PM Dictation workstation:   [id number]      Medications:  Scheduled medications  amLODIPine, 10 mg, oral, Daily  amoxicillin-pot clavulanate, 1 tablet, oral, q12h ROSY  buPROPion SR, 100 mg, oral, BID  citalopram, 20 mg, oral, Daily  digoxin, 125 mcg, oral, Daily  insulin glargine, 50 Units, subcutaneous, q24h  insulin lispro, 0-10 Units, subcutaneous, Before meals & nightly  insulin lispro, 7 Units, subcutaneous, TID  ipratropium-albuteroL, 3 mL, nebulization, TID  losartan, 25 mg, oral, Daily  magnesium oxide, 400 mg, oral, Daily  melatonin, 10 mg, oral, Nightly  metoprolol succinate XL, 100 mg, oral, BID  pantoprazole, 40 mg, oral, Daily before breakfast  polyethylene glycol, 17 g, oral, Daily  simvastatin, 20 mg, oral, Nightly  traZODone, 50 mg, oral, Nightly  warfarin, 4 mg, oral, Once per day on Monday Wednesday Friday  warfarin, 7.5 mg, oral, Once per day on Sunday Tuesday Thursday Saturday      Continuous medications     PRN medications  PRN medications: benzonatate, dextrose, dextrose, glucagon, glucagon, ipratropium-albuteroL, oxygen, QUEtiapine    Assessment    Assessment & Plan   Ms. Joana Morales is a 84 y.o. female who presents with a primary complaint of dry cough whose PMH is significant for COPD on nocturnal 2L NC, IDDM II, paroxsymal afib on Coumadin, HTN, HLD, and GERD.  In the emergency room, patient was hemodynamically stable but required 4 L NC compared to her home nocturnal 2 L NC.  Labs in the ED remarkable for leukocytosis of 13.  CT angio chest showed multifocal pneumonia in the  posterior RUL and posterior LLL.  Patient treated with Rocephin and azithromycin.     #Acute on chronic hypoxic respiratory failure 2/2 multifocal pneumonia (posterior RUL and posterior LLL)  - Legionella, strep pneumo urine antigens are negative  - Continue Rocephin 2 g IV daily until 10/27 and Zithromax 500 mg p.o. until 10/25  - Wean O2 as tolerated, maintain SpO2 >92%  - Encourage incentive spirometry use  - Duonebs q2h prn  - Tessalon Perles every 8 hours as needed for coughs  - Will monitor v/s   - Warfarin restarted since INR came down 2.7    #IDDM II  -On Lispro sliding scale #2  -On glargine 50 units at home  -Monitor morning CMP and order Lantus as needed     #A-fib on Coumadin  - Warfarin on hold  - Will follow INR     CHRONIC PROBLEMS:  # HTN  # HLD  # GERD  - Will Continue home medications as appropriate        Global Plan of Care  Fluid: As needed  Nutrition: Consistent carb   DVT Prophylaxis: Warfarin on hold because of high INR  GI Prophylaxis: None  Code Status: Full Code     Emergency Contact: Extended Emergency Contact Information  Primary Emergency Contact: GISSELLESHALOMDANICA  Mobile Phone: [phone]  Relation: Daughter  Preferred language: English   needed? No     Patient seen and discussed with the attending.  Signature: Natalio Figueroa MD,                 PGY I Internal medicine Resident  Date: October 27, 2024   This note has been transcribed using Dragon voice recognition system and there is a possibility of unintentional typing misprints.  Any information found to be copied from previous providers is done in the best interest of the patient to provide accurate, quality, and continuity of care.

## 2024-10-27 NOTE — CARE PLAN
Problem: Pain - Adult  Goal: Verbalizes/displays adequate comfort level or baseline comfort level  Outcome: Progressing     Problem: Discharge Planning  Goal: Discharge to home or other facility with appropriate resources  Outcome: Progressing     Problem: Chronic Conditions and Co-morbidities  Goal: Patient's chronic conditions and co-morbidity symptoms are monitored and maintained or improved  Outcome: Progressing     Problem: Skin  Goal: Decreased wound size/increased tissue granulation at next dressing change  Outcome: Progressing  Flowsheets (Taken 10/27/2024 1331)  Decreased wound size/increased tissue granulation at next dressing change:   Promote sleep for wound healing   Protective dressings over bony prominences  Goal: Participates in plan/prevention/treatment measures  Outcome: Progressing  Flowsheets (Taken 10/27/2024 1331)  Participates in plan/prevention/treatment measures:   Discuss with provider PT/OT consult   Elevate heels  Goal: Prevent/manage excess moisture  Outcome: Progressing  Flowsheets (Taken 10/27/2024 1331)  Prevent/manage excess moisture:   Monitor for/manage infection if present   Moisturize dry skin  Goal: Prevent/minimize sheer/friction injuries  Outcome: Progressing  Flowsheets (Taken 10/27/2024 1331)  Prevent/minimize sheer/friction injuries:   HOB 30 degrees or less   Turn/reposition every 2 hours/use positioning/transfer devices   Use pull sheet   Utilize specialty bed per algorithm  Goal: Promote/optimize nutrition  Outcome: Progressing  Flowsheets (Taken 10/27/2024 1331)  Promote/optimize nutrition:   Assist with feeding   Monitor/record intake including meals   Offer water/supplements/favorite foods  Goal: Promote skin healing  Outcome: Progressing  Flowsheets (Taken 10/27/2024 1331)  Promote skin healing:   Protective dressings over bony prominences   Rotate device position/do not position patient on device   Turn/reposition every 2 hours/use positioning/transfer devices      Problem: Fall/Injury  Goal: Not fall by end of shift  Outcome: Progressing  Goal: Be free from injury by end of the shift  Outcome: Progressing  Goal: Verbalize understanding of personal risk factors for fall in the hospital  Outcome: Progressing  Goal: Verbalize understanding of risk factor reduction measures to prevent injury from fall in the home  Outcome: Progressing  Goal: Use assistive devices by end of the shift  Outcome: Progressing  Goal: Pace activities to prevent fatigue by end of the shift  Outcome: Progressing   The patient's goals for the shift include remain free of falls    The clinical goals for the shift include Be free from fall/injury

## 2024-10-27 NOTE — PROGRESS NOTES
[facility] accepted. Placed call to Lucia to update and get foc. She would like [facility] as foc. Explained we will try to get skilled precert as patient is borderline and if it is not approved, we will ask [facility] to accept medicaid pending. She is agreeable to this plan and thankful for assistance.   Message sent to [facility] regarding precert/medicaid pend status. Asked precert team to start auth. Physicians updated.

## 2024-10-28 PROBLEM — J18.9 PNEUMONIA OF BOTH LUNGS DUE TO INFECTIOUS ORGANISM, UNSPECIFIED PART OF LUNG: Status: RESOLVED | Noted: 2024-10-24 | Resolved: 2024-10-28

## 2024-10-28 PROBLEM — J18.9 FACILITY-ACQUIRED PNEUMONIA: Status: RESOLVED | Noted: 2024-10-24 | Resolved: 2024-10-28

## 2024-10-28 PROBLEM — R53.1 WEAKNESS: Status: RESOLVED | Noted: 2024-10-26 | Resolved: 2024-10-28

## 2024-10-28 PROBLEM — R79.1 ABNORMAL INR: Status: RESOLVED | Noted: 2024-10-26 | Resolved: 2024-10-28

## 2024-10-28 LAB
GLUCOSE BLD MANUAL STRIP-MCNC: 155 MG/DL (ref 74–99)
GLUCOSE BLD MANUAL STRIP-MCNC: 219 MG/DL (ref 74–99)
GLUCOSE BLD MANUAL STRIP-MCNC: 75 MG/DL (ref 74–99)
GLUCOSE BLD MANUAL STRIP-MCNC: 79 MG/DL (ref 74–99)
HOLD SPECIMEN: NORMAL
INR PPP: 2.2 (ref 0.9–1.1)
MAGNESIUM SERPL-MCNC: 1.86 MG/DL (ref 1.6–2.4)
PROTHROMBIN TIME: 24.9 SECONDS (ref 9.8–12.8)

## 2024-10-28 PROCEDURE — 2500000002 HC RX 250 W HCPCS SELF ADMINISTERED DRUGS (ALT 637 FOR MEDICARE OP, ALT 636 FOR OP/ED)

## 2024-10-28 PROCEDURE — 99232 SBSQ HOSP IP/OBS MODERATE 35: CPT

## 2024-10-28 PROCEDURE — 85610 PROTHROMBIN TIME: CPT

## 2024-10-28 PROCEDURE — 83735 ASSAY OF MAGNESIUM: CPT

## 2024-10-28 PROCEDURE — 94640 AIRWAY INHALATION TREATMENT: CPT

## 2024-10-28 PROCEDURE — 2500000001 HC RX 250 WO HCPCS SELF ADMINISTERED DRUGS (ALT 637 FOR MEDICARE OP)

## 2024-10-28 PROCEDURE — 36415 COLL VENOUS BLD VENIPUNCTURE: CPT

## 2024-10-28 PROCEDURE — 2500000005 HC RX 250 GENERAL PHARMACY W/O HCPCS

## 2024-10-28 PROCEDURE — 2500000004 HC RX 250 GENERAL PHARMACY W/ HCPCS (ALT 636 FOR OP/ED)

## 2024-10-28 PROCEDURE — 82947 ASSAY GLUCOSE BLOOD QUANT: CPT

## 2024-10-28 PROCEDURE — 1100000001 HC PRIVATE ROOM DAILY

## 2024-10-28 RX ORDER — AMOXICILLIN AND CLAVULANATE POTASSIUM 875; 125 MG/1; MG/1
1 TABLET, FILM COATED ORAL 2 TIMES DAILY
Qty: 2 TABLET | Refills: 0 | Status: SHIPPED | OUTPATIENT
Start: 2024-10-28 | End: 2024-10-29

## 2024-10-28 ASSESSMENT — COGNITIVE AND FUNCTIONAL STATUS - GENERAL
MOVING FROM LYING ON BACK TO SITTING ON SIDE OF FLAT BED WITH BEDRAILS: A LITTLE
EATING MEALS: A LITTLE
MOVING TO AND FROM BED TO CHAIR: A LITTLE
HELP NEEDED FOR BATHING: A LITTLE
MOVING TO AND FROM BED TO CHAIR: A LITTLE
HELP NEEDED FOR BATHING: A LITTLE
WALKING IN HOSPITAL ROOM: A LITTLE
STANDING UP FROM CHAIR USING ARMS: A LITTLE
DAILY ACTIVITIY SCORE: 18
EATING MEALS: A LITTLE
TURNING FROM BACK TO SIDE WHILE IN FLAT BAD: A LITTLE
MOVING FROM LYING ON BACK TO SITTING ON SIDE OF FLAT BED WITH BEDRAILS: A LITTLE
DAILY ACTIVITIY SCORE: 18
TURNING FROM BACK TO SIDE WHILE IN FLAT BAD: A LITTLE
PERSONAL GROOMING: A LITTLE
DRESSING REGULAR LOWER BODY CLOTHING: A LITTLE
DRESSING REGULAR UPPER BODY CLOTHING: A LITTLE
PERSONAL GROOMING: A LITTLE
MOBILITY SCORE: 18
CLIMB 3 TO 5 STEPS WITH RAILING: A LITTLE
TOILETING: A LITTLE
STANDING UP FROM CHAIR USING ARMS: A LITTLE
MOBILITY SCORE: 18
WALKING IN HOSPITAL ROOM: A LITTLE
DRESSING REGULAR LOWER BODY CLOTHING: A LITTLE
DRESSING REGULAR UPPER BODY CLOTHING: A LITTLE
TOILETING: A LITTLE
CLIMB 3 TO 5 STEPS WITH RAILING: A LITTLE

## 2024-10-28 ASSESSMENT — PAIN SCALES - GENERAL
PAINLEVEL_OUTOF10: 0 - NO PAIN
PAINLEVEL_OUTOF10: 0 - NO PAIN

## 2024-10-28 NOTE — CARE PLAN
The patient's goals for the shift include remain free of falls    The clinical goals for the shift include safety will be maintained this shift    Goals progressing, safety maintained. Pt waiting precert for dc.

## 2024-10-28 NOTE — PROGRESS NOTES
10/28/24 1601   Discharge Planning   Home or Post Acute Services Post acute facilities (Rehab/SNF/etc)   Type of Post Acute Facility Services Skilled nursing   Expected Discharge Disposition SNF     The message from the direct pre cert team: hello, just spoke to insurance they are offering a P2P by calling [phone], by 12pm tomm 10/29 . Message sent to Dr. Porter at 1500.    Abdominal pain

## 2024-10-28 NOTE — CARE PLAN
The patient's goals for the shift include remain free of falls    The clinical goals for the shift include safety will be maintained this shift      Problem: Safety - Adult  Goal: Free from fall injury  Outcome: Progressing     Problem: Chronic Conditions and Co-morbidities  Goal: Patient's chronic conditions and co-morbidity symptoms are monitored and maintained or improved  Outcome: Progressing

## 2024-10-28 NOTE — PROGRESS NOTES
"ID:  Joana Morales is a 84 y.o. female on hospital day 4 of admission presenting with Facility-acquired pneumonia.    Subjective   The patient seen and examined this morning at bedside. No overnight event . This morning patient denies chest pain, fever, nausea, vomiting, diarrhea, headache, blurring of vision. INR came down to 2.7 on 10/27 and to 2.2 on 10/28. Patient put back on home dose of warfarin on 10/27.        Objective     Visit Vitals  /70   Pulse 68   Temp 36 °C (96.8 °F) (Temporal)   Resp 18   Ht 1.651 m (5' 5\")   Wt 79.4 kg (175 lb)   SpO2 91%   BMI 29.12 kg/m²   Smoking Status Never   BSA 1.91 m²        Physical Examination:    General:  alert, not in acute distress, resting comfortably in bed upon examination   HEENT: Normocephalic, atraumatic, EOMI, moist mucous membranes  Neck: Neck supple, trachea midline, no evidence of trauma  Cardiovascular:  S1 & S2 appreciated, no murmurs, rubs, or gallops appreciated  Respiratory:  no respiratory distress, no wheezing, rales or rhonchi  Abdomen: Soft, nondistended, nontender to palpation,no guarding rigidity or rebound tenderness  MSK: No joint swelling, normal movements of all extremities.   Neuro: No focal deficits, normal motor function, normal sensation  Skin: Warm and dry, no lesions, contusions, or erythema.    Laboratory Data:      Results for orders placed or performed during the hospital encounter of 10/23/24 (from the past 96 hours)   POCT GLUCOSE   Result Value Ref Range    POCT Glucose 366 (H) 74 - 99 mg/dL   POCT GLUCOSE   Result Value Ref Range    POCT Glucose 329 (H) 74 - 99 mg/dL   Legionella Antigen, Urine    Specimen: Urine   Result Value Ref Range    L. pneumophila Urine Ag Negative Negative   Streptococcus pneumoniae Antigen, Urine    Specimen: Urine   Result Value Ref Range    Streptococcus pneumoniae Ag, Urine Negative Negative   POCT GLUCOSE   Result Value Ref Range    POCT Glucose 439 (H) 74 - 99 mg/dL   POCT GLUCOSE   Result " Value Ref Range    POCT Glucose 397 (H) 74 - 99 mg/dL   CBC   Result Value Ref Range    WBC 11.6 (H) 4.4 - 11.3 x10*3/uL    nRBC 0.0 0.0 - 0.0 /100 WBCs    RBC 4.90 4.00 - 5.20 x10*6/uL    Hemoglobin 13.1 12.0 - 16.0 g/dL    Hematocrit 41.7 36.0 - 46.0 %    MCV 85 80 - 100 fL    MCH 26.7 26.0 - 34.0 pg    MCHC 31.4 (L) 32.0 - 36.0 g/dL    RDW 14.6 (H) 11.5 - 14.5 %    Platelets 216 150 - 450 x10*3/uL   Magnesium   Result Value Ref Range    Magnesium 2.08 1.60 - 2.40 mg/dL   Renal Function Panel   Result Value Ref Range    Glucose 363 (H) 74 - 99 mg/dL    Sodium 132 (L) 136 - 145 mmol/L    Potassium 4.0 3.5 - 5.3 mmol/L    Chloride 97 (L) 98 - 107 mmol/L    Bicarbonate 27 21 - 32 mmol/L    Anion Gap 12 10 - 20 mmol/L    Urea Nitrogen 34 (H) 6 - 23 mg/dL    Creatinine 0.76 0.50 - 1.05 mg/dL    eGFR 76 >60 mL/min/1.73m*2    Calcium 8.6 8.6 - 10.3 mg/dL    Phosphorus 3.0 2.5 - 4.9 mg/dL    Albumin 3.2 (L) 3.4 - 5.0 g/dL   Protime-INR   Result Value Ref Range    Protime 55.4 (HH) 9.8 - 12.8 seconds    INR 4.8 (H) 0.9 - 1.1   POCT GLUCOSE   Result Value Ref Range    POCT Glucose 373 (H) 74 - 99 mg/dL   POCT GLUCOSE   Result Value Ref Range    POCT Glucose 429 (H) 74 - 99 mg/dL   POCT GLUCOSE   Result Value Ref Range    POCT Glucose 325 (H) 74 - 99 mg/dL   POCT GLUCOSE   Result Value Ref Range    POCT Glucose 210 (H) 74 - 99 mg/dL   POCT GLUCOSE   Result Value Ref Range    POCT Glucose 98 74 - 99 mg/dL   POCT GLUCOSE   Result Value Ref Range    POCT Glucose 101 (H) 74 - 99 mg/dL   CBC   Result Value Ref Range    WBC 8.8 4.4 - 11.3 x10*3/uL    nRBC 0.0 0.0 - 0.0 /100 WBCs    RBC 5.32 (H) 4.00 - 5.20 x10*6/uL    Hemoglobin 14.1 12.0 - 16.0 g/dL    Hematocrit 44.5 36.0 - 46.0 %    MCV 84 80 - 100 fL    MCH 26.5 26.0 - 34.0 pg    MCHC 31.7 (L) 32.0 - 36.0 g/dL    RDW 14.8 (H) 11.5 - 14.5 %    Platelets 269 150 - 450 x10*3/uL   Magnesium   Result Value Ref Range    Magnesium 1.86 1.60 - 2.40 mg/dL   Renal Function Panel    Result Value Ref Range    Glucose 101 (H) 74 - 99 mg/dL    Sodium 140 136 - 145 mmol/L    Potassium 3.5 3.5 - 5.3 mmol/L    Chloride 101 98 - 107 mmol/L    Bicarbonate 32 21 - 32 mmol/L    Anion Gap 11 10 - 20 mmol/L    Urea Nitrogen 24 (H) 6 - 23 mg/dL    Creatinine 0.72 0.50 - 1.05 mg/dL    eGFR 82 >60 mL/min/1.73m*2    Calcium 9.2 8.6 - 10.3 mg/dL    Phosphorus 2.8 2.5 - 4.9 mg/dL    Albumin 3.5 3.4 - 5.0 g/dL   Protime-INR   Result Value Ref Range    Protime 43.2 (H) 9.8 - 12.8 seconds    INR 3.8 (H) 0.9 - 1.1   POCT GLUCOSE   Result Value Ref Range    POCT Glucose 103 (H) 74 - 99 mg/dL   POCT GLUCOSE   Result Value Ref Range    POCT Glucose 250 (H) 74 - 99 mg/dL   POCT GLUCOSE   Result Value Ref Range    POCT Glucose 83 74 - 99 mg/dL   POCT GLUCOSE   Result Value Ref Range    POCT Glucose 100 (H) 74 - 99 mg/dL   POCT GLUCOSE   Result Value Ref Range    POCT Glucose 56 (L) 74 - 99 mg/dL   Lavender Top   Result Value Ref Range    Extra Tube Hold for add-ons.    PST Top   Result Value Ref Range    Extra Tube Hold for add-ons.    Protime-INR   Result Value Ref Range    Protime 30.5 (H) 9.8 - 12.8 seconds    INR 2.7 (H) 0.9 - 1.1   POCT GLUCOSE   Result Value Ref Range    POCT Glucose 69 (L) 74 - 99 mg/dL   POCT GLUCOSE   Result Value Ref Range    POCT Glucose 176 (H) 74 - 99 mg/dL   POCT GLUCOSE   Result Value Ref Range    POCT Glucose 177 (H) 74 - 99 mg/dL   POCT GLUCOSE   Result Value Ref Range    POCT Glucose 133 (H) 74 - 99 mg/dL   POCT GLUCOSE   Result Value Ref Range    POCT Glucose 255 (H) 74 - 99 mg/dL   Magnesium   Result Value Ref Range    Magnesium 1.86 1.60 - 2.40 mg/dL   Protime-INR   Result Value Ref Range    Protime 24.9 (H) 9.8 - 12.8 seconds    INR 2.2 (H) 0.9 - 1.1   POCT GLUCOSE   Result Value Ref Range    POCT Glucose 79 74 - 99 mg/dL   Lavender Top   Result Value Ref Range    Extra Tube Hold for add-ons.         Imaging:  XR chest 1 view    Result Date: 10/26/2024  Interpreted By:  Gurpreet  Claudette, STUDY: XR CHEST 1 VIEW;  10/26/2024 2:24 pm   INDICATION: Signs/Symptoms:COPD treated for pneumonia? pulmonary edema.     COMPARISON: Chest x-ray 10/22/2024. CT chest 10/23/2024.   ACCESSION NUMBER(S): [id number]   ORDERING CLINICIAN: ISRA GARCIA   FINDINGS: AP radiograph of the chest was provided.       CARDIOMEDIASTINAL SILHOUETTE: Stable mediastinal contours with tortuous thoracic aorta and cardiomegaly.   LUNGS: Central hilar vasculature and interstitial lung markings are within normal limits. There are subtle hazy opacities in the right infrahilar and left retrocardiac regions, consistent with multifocal pneumonia and atelectasis seen on recent chest CT. No new focal area of consolidation or airspace opacities otherwise seen. No pleural effusion or pneumothorax.   ABDOMEN: No remarkable upper abdominal findings.   BONES: No acute osseous changes.       1. Hazy opacities in the right infrahilar and left retrocardiac regions are consistent with multifocal pneumonia and atelectasis seen on recent chest CT. 2. Cardiomegaly.       MACRO: None   Signed by: Claudette Vázquez 10/26/2024 2:33 PM Dictation workstation:   [id number]      Medications:  Scheduled medications  amLODIPine, 10 mg, oral, Daily  amoxicillin-pot clavulanate, 1 tablet, oral, q12h ROSY  buPROPion SR, 100 mg, oral, BID  citalopram, 20 mg, oral, Daily  digoxin, 125 mcg, oral, Daily  insulin glargine, 40 Units, subcutaneous, q24h  insulin lispro, 0-10 Units, subcutaneous, Before meals & nightly  insulin lispro, 7 Units, subcutaneous, TID  ipratropium-albuteroL, 3 mL, nebulization, TID  losartan, 25 mg, oral, Daily  magnesium oxide, 400 mg, oral, Daily  melatonin, 10 mg, oral, Nightly  metoprolol succinate XL, 100 mg, oral, BID  pantoprazole, 40 mg, oral, Daily before breakfast  polyethylene glycol, 17 g, oral, Daily  simvastatin, 20 mg, oral, Nightly  traZODone, 50 mg, oral, Nightly  warfarin, 4 mg, oral, Once per day on Monday Wednesday  Friday  warfarin, 7.5 mg, oral, Once per day on Sunday Tuesday Thursday Saturday      Continuous medications     PRN medications  PRN medications: benzonatate, dextrose, dextrose, glucagon, glucagon, ipratropium-albuteroL, oxygen, QUEtiapine    Assessment    Assessment & Plan   Ms. Joana Morales is a 84 y.o. female who presents with a primary complaint of dry cough whose PMH is significant for COPD on nocturnal 2L NC, IDDM II, paroxsymal afib on Coumadin, HTN, HLD, and GERD.  In the emergency room, patient was hemodynamically stable but required 4 L NC compared to her home nocturnal 2 L NC.  Labs in the ED remarkable for leukocytosis of 13.  CT angio chest showed multifocal pneumonia in the posterior RUL and posterior LLL.  Patient treated with Rocephin and azithromycin.     #Acute on chronic hypoxic respiratory failure 2/2 multifocal pneumonia (posterior RUL and posterior LLL)  - Legionella, strep pneumo urine antigens are negative  - Continue Rocephin 2 g IV daily until 10/27 and Zithromax 500 mg p.o. until 10/25  - Wean O2 as tolerated, maintain SpO2 >92%  - Encourage incentive spirometry use  - Duonebs q2h prn  - Tessalon Perles every 8 hours as needed for coughs  - Will monitor v/s   - Warfarin restarted on 10/27 since INR came down 2.7->2.2    #IDDM II  -On Lispro sliding scale #2  -On glargine 50 units at home  -Monitor morning CMP and order Lantus as needed     #A-fib on Coumadin  - Warfarin restarted  - Will follow INR     CHRONIC PROBLEMS:  # HTN  # HLD  # GERD  - Will Continue home medications as appropriate        Global Plan of Care  Fluid: As needed  Nutrition: Consistent carb   DVT Prophylaxis: Warfarin restarted  GI Prophylaxis: None  Code Status: Full Code     Emergency Contact: Extended Emergency Contact Information  Primary Emergency Contact: DANICA CALDERON  Mobile Phone: [phone]  Relation: Daughter  Preferred language: English   needed? No     Patient seen and discussed with the  attending.  Signature: Natalio Figueroa MD,                 PGY I Internal medicine Resident  Date: October 28, 2024   This note has been transcribed using Dragon voice recognition system and there is a possibility of unintentional typing misprints.  Any information found to be copied from previous providers is done in the best interest of the patient to provide accurate, quality, and continuity of care.

## 2024-10-29 VITALS
BODY MASS INDEX: 29.16 KG/M2 | RESPIRATION RATE: 17 BRPM | WEIGHT: 175 LBS | DIASTOLIC BLOOD PRESSURE: 76 MMHG | HEART RATE: 55 BPM | OXYGEN SATURATION: 98 % | HEIGHT: 65 IN | TEMPERATURE: 96.8 F | SYSTOLIC BLOOD PRESSURE: 177 MMHG

## 2024-10-29 LAB
ALBUMIN SERPL BCP-MCNC: 3 G/DL (ref 3.4–5)
ANION GAP SERPL CALC-SCNC: 9 MMOL/L (ref 10–20)
BASOPHILS # BLD AUTO: 0.05 X10*3/UL (ref 0–0.1)
BASOPHILS NFR BLD AUTO: 0.7 %
BUN SERPL-MCNC: 16 MG/DL (ref 6–23)
CALCIUM SERPL-MCNC: 9.1 MG/DL (ref 8.6–10.3)
CHLORIDE SERPL-SCNC: 104 MMOL/L (ref 98–107)
CO2 SERPL-SCNC: 31 MMOL/L (ref 21–32)
CREAT SERPL-MCNC: 0.75 MG/DL (ref 0.5–1.05)
EGFRCR SERPLBLD CKD-EPI 2021: 78 ML/MIN/1.73M*2
EOSINOPHIL # BLD AUTO: 0.24 X10*3/UL (ref 0–0.4)
EOSINOPHIL NFR BLD AUTO: 3.1 %
ERYTHROCYTE [DISTWIDTH] IN BLOOD BY AUTOMATED COUNT: 14.9 % (ref 11.5–14.5)
EST. AVERAGE GLUCOSE BLD GHB EST-MCNC: 171 MG/DL
GLUCOSE BLD MANUAL STRIP-MCNC: 133 MG/DL (ref 74–99)
GLUCOSE BLD MANUAL STRIP-MCNC: 153 MG/DL (ref 74–99)
GLUCOSE BLD MANUAL STRIP-MCNC: 79 MG/DL (ref 74–99)
GLUCOSE SERPL-MCNC: 69 MG/DL (ref 74–99)
HBA1C MFR BLD: 7.6 %
HCT VFR BLD AUTO: 42.2 % (ref 36–46)
HGB BLD-MCNC: 12.7 G/DL (ref 12–16)
HOLD SPECIMEN: NORMAL
IMM GRANULOCYTES # BLD AUTO: 0.06 X10*3/UL (ref 0–0.5)
IMM GRANULOCYTES NFR BLD AUTO: 0.8 % (ref 0–0.9)
INR PPP: 2.1 (ref 0.9–1.1)
LYMPHOCYTES # BLD AUTO: 1.67 X10*3/UL (ref 0.8–3)
LYMPHOCYTES NFR BLD AUTO: 21.7 %
MAGNESIUM SERPL-MCNC: 1.78 MG/DL (ref 1.6–2.4)
MCH RBC QN AUTO: 25.8 PG (ref 26–34)
MCHC RBC AUTO-ENTMCNC: 30.1 G/DL (ref 32–36)
MCV RBC AUTO: 86 FL (ref 80–100)
MONOCYTES # BLD AUTO: 0.77 X10*3/UL (ref 0.05–0.8)
MONOCYTES NFR BLD AUTO: 10 %
NEUTROPHILS # BLD AUTO: 4.9 X10*3/UL (ref 1.6–5.5)
NEUTROPHILS NFR BLD AUTO: 63.7 %
NRBC BLD-RTO: 0 /100 WBCS (ref 0–0)
PHOSPHATE SERPL-MCNC: 4.4 MG/DL (ref 2.5–4.9)
PLATELET # BLD AUTO: 274 X10*3/UL (ref 150–450)
POTASSIUM SERPL-SCNC: 3.6 MMOL/L (ref 3.5–5.3)
PROTHROMBIN TIME: 24.3 SECONDS (ref 9.8–12.8)
RBC # BLD AUTO: 4.92 X10*6/UL (ref 4–5.2)
SODIUM SERPL-SCNC: 140 MMOL/L (ref 136–145)
WBC # BLD AUTO: 7.9 X10*3/UL (ref 4.4–11.3)

## 2024-10-29 PROCEDURE — 2500000001 HC RX 250 WO HCPCS SELF ADMINISTERED DRUGS (ALT 637 FOR MEDICARE OP)

## 2024-10-29 PROCEDURE — 85025 COMPLETE CBC W/AUTO DIFF WBC: CPT

## 2024-10-29 PROCEDURE — 94640 AIRWAY INHALATION TREATMENT: CPT

## 2024-10-29 PROCEDURE — 83735 ASSAY OF MAGNESIUM: CPT

## 2024-10-29 PROCEDURE — 2500000004 HC RX 250 GENERAL PHARMACY W/ HCPCS (ALT 636 FOR OP/ED)

## 2024-10-29 PROCEDURE — 2500000002 HC RX 250 W HCPCS SELF ADMINISTERED DRUGS (ALT 637 FOR MEDICARE OP, ALT 636 FOR OP/ED)

## 2024-10-29 PROCEDURE — 97110 THERAPEUTIC EXERCISES: CPT | Mod: GP | Performed by: PHYSICAL THERAPIST

## 2024-10-29 PROCEDURE — 99239 HOSP IP/OBS DSCHRG MGMT >30: CPT

## 2024-10-29 PROCEDURE — 82947 ASSAY GLUCOSE BLOOD QUANT: CPT

## 2024-10-29 PROCEDURE — 36415 COLL VENOUS BLD VENIPUNCTURE: CPT

## 2024-10-29 PROCEDURE — 83036 HEMOGLOBIN GLYCOSYLATED A1C: CPT | Mod: STJLAB

## 2024-10-29 PROCEDURE — 85610 PROTHROMBIN TIME: CPT

## 2024-10-29 PROCEDURE — 97116 GAIT TRAINING THERAPY: CPT | Mod: GP | Performed by: PHYSICAL THERAPIST

## 2024-10-29 PROCEDURE — 80069 RENAL FUNCTION PANEL: CPT

## 2024-10-29 RX ORDER — LOSARTAN POTASSIUM 50 MG/1
50 TABLET ORAL DAILY
Status: DISCONTINUED | OUTPATIENT
Start: 2024-10-29 | End: 2024-10-29 | Stop reason: HOSPADM

## 2024-10-29 RX ORDER — FUROSEMIDE 80 MG/1
40 TABLET ORAL DAILY
Start: 2024-10-29 | End: 2024-11-28

## 2024-10-29 RX ORDER — POTASSIUM CHLORIDE 20 MEQ/1
40 TABLET, EXTENDED RELEASE ORAL ONCE
Status: COMPLETED | OUTPATIENT
Start: 2024-10-29 | End: 2024-10-29

## 2024-10-29 RX ORDER — INSULIN GLARGINE 100 [IU]/ML
25 INJECTION, SOLUTION SUBCUTANEOUS NIGHTLY
Start: 2024-10-29 | End: 2024-11-28

## 2024-10-29 RX ORDER — MAGNESIUM SULFATE HEPTAHYDRATE 40 MG/ML
2 INJECTION, SOLUTION INTRAVENOUS ONCE
Status: COMPLETED | OUTPATIENT
Start: 2024-10-29 | End: 2024-10-29

## 2024-10-29 RX ORDER — LOSARTAN POTASSIUM 25 MG/1
50 TABLET ORAL DAILY
Start: 2024-10-29 | End: 2024-11-28

## 2024-10-29 RX ORDER — INSULIN GLARGINE 100 [IU]/ML
30 INJECTION, SOLUTION SUBCUTANEOUS EVERY 24 HOURS
Status: DISCONTINUED | OUTPATIENT
Start: 2024-10-29 | End: 2024-10-29 | Stop reason: HOSPADM

## 2024-10-29 ASSESSMENT — PAIN SCALES - GENERAL
PAINLEVEL_OUTOF10: 0 - NO PAIN
PAINLEVEL_OUTOF10: 0 - NO PAIN

## 2024-10-29 ASSESSMENT — COGNITIVE AND FUNCTIONAL STATUS - GENERAL
TURNING FROM BACK TO SIDE WHILE IN FLAT BAD: A LITTLE
STANDING UP FROM CHAIR USING ARMS: A LITTLE
MOVING TO AND FROM BED TO CHAIR: A LITTLE
WALKING IN HOSPITAL ROOM: A LITTLE
MOBILITY SCORE: 17
CLIMB 3 TO 5 STEPS WITH RAILING: TOTAL
MOBILITY SCORE: 17
WALKING IN HOSPITAL ROOM: A LITTLE
MOVING TO AND FROM BED TO CHAIR: A LITTLE
STANDING UP FROM CHAIR USING ARMS: A LITTLE
DAILY ACTIVITIY SCORE: 18
CLIMB 3 TO 5 STEPS WITH RAILING: A LOT
PERSONAL GROOMING: A LITTLE
EATING MEALS: A LITTLE
TOILETING: A LITTLE
DRESSING REGULAR LOWER BODY CLOTHING: A LITTLE
HELP NEEDED FOR BATHING: A LITTLE
DRESSING REGULAR UPPER BODY CLOTHING: A LITTLE
TURNING FROM BACK TO SIDE WHILE IN FLAT BAD: A LITTLE
MOVING FROM LYING ON BACK TO SITTING ON SIDE OF FLAT BED WITH BEDRAILS: A LITTLE

## 2024-10-29 ASSESSMENT — PAIN - FUNCTIONAL ASSESSMENT: PAIN_FUNCTIONAL_ASSESSMENT: 0-10

## 2024-10-29 NOTE — PROGRESS NOTES
Spiritual Care Visit    Clinical Encounter Type  Visited With: Patient and family together  Routine Visit: Introduction              Sacramental Encounters  Sacrament of Sick-Anointing: Patient declined anointing                             Taxonomy  Intended Effects: Build relationship of care and support, Preserve dignity and respect, Establish rapport and connectedness  Methods: Offer spiritual/Nondenominational support  Interventions: Acknowledge current situation, Ask guided questions, Share words of hope and inspiration

## 2024-10-29 NOTE — CARE PLAN
The patient's goals for the shift include remain free of falls    The clinical goals for the shift include pt will remain free from fall/injury

## 2024-10-29 NOTE — CARE PLAN
The patient's goals for the shift include remain free of falls    The clinical goals for the shift include Pt's O2 sats will remain greater than 93%      Problem: Safety - Adult  Goal: Free from fall injury  Outcome: Progressing     Problem: Skin  Goal: Participates in plan/prevention/treatment measures  Outcome: Progressing  Flowsheets (Taken 10/29/2024 1680)  Participates in plan/prevention/treatment measures:   Discuss with provider PT/OT consult   Elevate heels   Increase activity/out of bed for meals

## 2024-10-29 NOTE — PROGRESS NOTES
Physical Therapy    Physical Therapy Treatment    Patient Name: Joana Morales  MRN: [id number]  Today's Date: 10/29/2024        3116/3116-A    Assessment/Plan         10/29/24 1118   PT  Visit   PT Received On 10/29/24   General   Reason for Referral impairedmobility   Referred By Dr. Eldridge   Past Medical History Relevant to Rehab COPD on nocturnal 2L NC, IDDM II, paroxsymal afib on Coumadin, HTN, HLD, and GERD   Prior to Session Communication Bedside nurse   Patient Position Received Up in chair;Alarm on   General Comment Pt agreeable to eval;somewhat delayed response to questions but all answers appropriate   Precautions   Medical Precautions Fall precautions   Pain Assessment   Pain Assessment 0-10   0-10 (Numeric) Pain Score 0 - No pain   Cognition   Overall Cognitive Status WFL   Orientation Level Oriented X4   Processing Speed Delayed  ((mild))   Activity Tolerance   Endurance Decreased tolerance for upright activites  (Pt reports fatigue; no SOB noted; 2 L O2 throughout session)   Static Standing Balance   Static Standing-Comment/Number of Minutes Fair-; UE support and CGA required   Dynamic Standing Balance   Dynamic Standing-Balance   (Fair-)   Dynamic Standing-Comments Reaching for target with R/L hand in standing, opposite hand on walker for balance.Able to reach 5 inches outside SORAYA with unilateral UE support   Transfers   Transfer Yes   Transfer 1   Technique 1 Sit to stand;Stand to sit   Transfer Device 1 Gait belt   Transfer Level of Assistance 1 Contact guard   Trials/Comments 1 VC's for hand placement. Sit<>stand x 6 trials for emphasis  on technique.   Ambulation/Gait Training   Ambulation/Gait Training Performed Yes   Ambulation/Gait Training 1   Surface 1 Level tile   Device 1 Rolling walker   Gait Support Devices Gait belt   Assistance 1 Contact guard;Minimal verbal cues   Quality of Gait 1 Inconsistent stride length;Decreased step length  (min A on turns)   Comments/Distance (ft) 1 40 ft x 2  with seated rest between trials   PT Assessment   PT Assessment Results Decreased strength;Decreased endurance;Impaired balance;Decreased mobility   Rehab Prognosis Good   End of Session Communication Care Coordinator   Assessment Comment Pt currently requires CGA and VC's for all mobility; presents at a lower functional level than baseline. Tolerated tx well. Continue with plan     End of Session Patient Position Up in chair;Alarm on   PT Plan   PT Plan Ongoing PT   PT Frequency 4 times per week   PT Discharge Recommendations   (Initial DC rec for low-intensity. Pt currently requires CGA and VC's for all functional mobility. If [facility] unable to accept pt at this level, rec: mod-intensity PT. PT does require hands-on assist with transfers/gait.)   Equipment Recommended upon Discharge Wheeled walker   PT Recommended Transfer Status Contact guard   PT - OK to Discharge Yes         Outcome Measures:  [facility] Basic Mobility  Turning from your back to your side while in a flat bed without using bedrails: None  Moving from lying on your back to sitting on the side of a flat bed without using bedrails: A little  Moving to and from bed to chair (including a wheelchair): A little  Standing up from a chair using your arms (e.g. wheelchair or bedside chair): A little  To walk in hospital room: A little  Climbing 3-5 steps with railing: Total  Basic Mobility - Total Score: 17                             EDUCATION:  Outpatient Education  Individual(s) Educated: Patient  Education Provided: Fall Risk  Education Documentation  Handouts, taught by Mary Jo Macias PT at 10/29/2024 11:59 AM.  Learner: Patient  Readiness: Acceptance  Method: Explanation, Demonstration  Response: Verbalizes Understanding, Needs Reinforcement    Precautions, taught by Mary Jo Macias PT at 10/29/2024 11:59 AM.  Learner: Patient  Readiness: Acceptance  Method: Explanation, Demonstration  Response: Verbalizes Understanding, Needs Reinforcement    Body  Mechanics, taught by Mary Jo Macias PT at 10/29/2024 11:59 AM.  Learner: Patient  Readiness: Acceptance  Method: Explanation, Demonstration  Response: Verbalizes Understanding, Needs Reinforcement    Home Exercise Program, taught by Mary Jo Macias PT at 10/29/2024 11:59 AM.  Learner: Patient  Readiness: Acceptance  Method: Explanation, Demonstration  Response: Verbalizes Understanding, Needs Reinforcement    Mobility Training, taught by Mary Jo Macias PT at 10/29/2024 11:59 AM.  Learner: Patient  Readiness: Acceptance  Method: Explanation, Demonstration  Response: Verbalizes Understanding, Needs Reinforcement    Education Comments  No comments found.        GOALS:  Encounter Problems       Encounter Problems (Active)       Mobility       Pt will AMB 75 ft with RW, mod I. (Initial goal met and updated this date)      Pt will be able to ambulate >/= 25 ft with FWW CGA with good safety awareness.  (Met)       Start:  10/24/24    Expected End:  11/07/24    Resolved:  10/29/24         Pt will complete supine, seated and standing exercises to maintain/improve overall strength with minimal verbal cues.   (Progressing)       Start:  10/24/24    Expected End:  11/07/24               PT Transfers       Pt will be able to complete all transfers with LRD CGA demonstrating good safety awareness and proper body mechanics.  (Progressing)       Start:  10/24/24    Expected End:  11/07/24               Pain - Adult          Safety       LTG - Patient will adhere to hip precautions during ADL's and transfers       Start:  10/24/24            LTG - Patient will demonstrate safety requirements appropriate to situation/environment       Start:  10/24/24            LTG - Patient will utilize safety techniques       Start:  10/24/24            STG - Patient locks brakes on wheelchair       Start:  10/24/24            STG - Patient uses call light consistently to request assistance with transfers       Start:  10/24/24            STG  - Patient uses gait belt during all transfers       Start:  10/24/24            Goal 1       Start:  10/24/24            Goal 2       Start:  10/24/24            Goal 3       Start:  10/24/24                         .

## 2024-10-29 NOTE — PROGRESS NOTES
P2P was completed by Dr. Singletary and overturned. Pt is approved for [facility]. STACEY Morocho SW notified to assist with discharge.

## 2024-10-29 NOTE — PROGRESS NOTES
10/29/24 1433   Discharge Planning   Home or Post Acute Services Post acute facilities (Rehab/SNF/etc)   Type of Post Acute Facility Services Skilled nursing   Expected Discharge Disposition SNF  (ONBV)     Received auth. Asked the D[facility] to send the [facility], AVS and 7000. Transport has been set for 2:30pm. Updated the facility, nursing and the pt's dtr who is at bedside.

## 2024-10-29 NOTE — DISCHARGE SUMMARY
Discharge Diagnosis  Facility-acquired pneumonia    Issues Requiring Follow-Up  Multifocal pneumonia    Discharge Meds     Medication List      START taking these medications     amoxicillin-pot clavulanate 875-125 mg tablet; Commonly known as:   Augmentin; Take 1 tablet by mouth 2 times a day for 1 day.     CHANGE how you take these medications     Basaglar KwikPen U-100 Insulin 100 unit/mL (3 mL) pen; Generic drug:   insulin glargine; Inject 25 Units under the skin once daily at bedtime.   Take as directed per insulin instructions.; What changed: how much to take   furosemide 80 mg tablet; Commonly known as: Lasix; Take 0.5 tablets (40   mg) by mouth once daily.; What changed: how much to take   losartan 25 mg tablet; Commonly known as: Cozaar; Take 2 tablets (50 mg)   by mouth once daily.; What changed: how much to take     CONTINUE taking these medications     alendronate 70 mg tablet; Commonly known as: Fosamax   amLODIPine 10 mg tablet; Commonly known as: Norvasc   buPROPion  mg 12 hr tablet; Commonly known as: Wellbutrin SR   citalopram 20 mg tablet; Commonly known as: CeleXA   coenzyme Q-10 100 mg capsule   Colace 100 mg capsule; Generic drug: docusate sodium   digoxin 125 MCG tablet; Commonly known as: Lanoxin   ferrous sulfate (325 mg ferrous sulfate) tablet   magnesium oxide 400 mg (241.3 mg magnesium) tablet; Commonly known as:   Mag-Ox   melatonin 5 mg tablet   metFORMIN 500 mg tablet; Commonly known as: Glucophage   metoprolol succinate  mg 24 hr tablet; Commonly known as:   Toprol-XL   omeprazole 40 mg DR capsule; Commonly known as: PriLOSEC   simvastatin 20 mg tablet; Commonly known as: Zocor   traZODone 50 mg tablet; Commonly known as: Desyrel   * warfarin 4 mg tablet; Commonly known as: Coumadin   * warfarin 7.5 mg tablet; Commonly known as: Coumadin  * This list has 2 medication(s) that are the same as other medications   prescribed for you. Read the directions carefully, and ask your  doctor or   other care provider to review them with you.     STOP taking these medications     glipiZIDE 5 mg tablet; Commonly known as: Glucotrol   HumaLOG KwikPen Insulin 100 unit/mL injection; Generic drug: insulin   lispro   ibuprofen 600 mg tablet   Januvia 50 mg tablet; Generic drug: SITagliptin phosphate   potassium chloride CR 20 mEq ER tablet; Commonly known as: Klor-Con M20       Test Results Pending At Discharge  Pending Labs       No current pending labs.            Hospital Course  Joana Morales is an 84-year-old female with a medical history notable for COPD on 2 L, IDDM 2, paroxysmal atrial fibrillation on Coumadin, HTN, HLD, GERD who presented to the [facility] due to nonproductive cough.  COVID/flu negative.  CT chest noting multifocal pneumonia involving the posterior right upper lobe and left lower lobe.  Initially started on Rocephin and azithromycin plus Decadron in the ED.  Admitted for acute hypoxic respiratory failure on 4 L which is increased from her baseline which was suspected to be secondary to multifocal pneumonia.  Patient eventually transition to Augmentin through 10/29 to complete a total 5-day course.  Oxygen requirements improved back to baseline status.  Patient also had intermittent episodes of hypoglycemia during her hospital stay.  A1c obtained.  On discharge discontinued home glipizide and Januvia due to hypoglycemia.  Home glargine was decreased from 50 units to 25 units at bedtime.  Short acting mealtime insulin was discontinued.  Patient was also persistently hypertensive therefore losartan increased from 25 to 50 mg daily.  Patient ultimately medically cleared for discharge to follow-up with PCP.    Medications discontinued: Glipizide, Januvia, ibuprofen, potassium chloride, mealtime insulin  Medication changes: Losartan increased to 50 mg, Lantus decreased to 25 units at bedtime      Pertinent Physical Exam At Time of Discharge  Physical Exam    General:  Awake, alert/oriented x4, well developed, no acute distress  Head: Atraumatic/Normocephalic  Eyes: Normal external exam  ENT: Oropharynx normal. Uvula midline, no tonsillar edema, moist mucous membranes  Cardiovascular: RRR, S1/S2, no murmurs, rubs, or gallops, radial pulses +2, no edema of extremities  Pulmonary: CTAB, no respiratory distress. No wheezes, rales, or ronchi  Abdomen: +BS, soft, non-tender, nondistended, no guarding or rebound, no masses noted  Skin- No lesions, contusions, or erythema.  Neuro: Alert/oriented x4, no focal motor or sensory deficits    Outpatient Follow-Up  No future appointments.      Devika Addison, DO  Internal medicine, PGY 3

## 2024-10-29 NOTE — PROGRESS NOTES
Joana Morales is a 84 y.o. female on day 5 of admission presenting with Facility-acquired pneumonia.      Subjective   No acute events overnight however patient has appeared persistently hypertensive over the course of the admission.  Overnight BPs in the 190s systolic.  Patient also intermittently hypoglycemic.  Currently on 40u long-acting insulin and 7 units 3 times daily with meals.       Objective     Last Recorded Vitals  BP (!) 187/96   Pulse 55   Temp 36 °C (96.8 °F) (Temporal)   Resp 17   Wt 79.4 kg (175 lb)   SpO2 98%   Intake/Output last 3 Shifts:  No intake or output data in the 24 hours ending 10/29/24 1103    Admission Weight  Weight: 79.4 kg (175 lb) (10/23/24 1358)    Daily Weight  10/23/24 : 79.4 kg (175 lb)    Image Results  XR chest 1 view  Narrative: Interpreted By:  Claudette Vázquez,   STUDY:  XR CHEST 1 VIEW;  10/26/2024 2:24 pm      INDICATION:  Signs/Symptoms:COPD treated for pneumonia? pulmonary edema.          COMPARISON:  Chest x-ray 10/22/2024. CT chest 10/23/2024.      ACCESSION NUMBER(S):  [id number]      ORDERING CLINICIAN:  ISRA GARCIA      FINDINGS:  AP radiograph of the chest was provided.              CARDIOMEDIASTINAL SILHOUETTE:  Stable mediastinal contours with tortuous thoracic aorta and  cardiomegaly.      LUNGS:  Central hilar vasculature and interstitial lung markings are within  normal limits. There are subtle hazy opacities in the right  infrahilar and left retrocardiac regions, consistent with multifocal  pneumonia and atelectasis seen on recent chest CT. No new focal area  of consolidation or airspace opacities otherwise seen. No pleural  effusion or pneumothorax.      ABDOMEN:  No remarkable upper abdominal findings.      BONES:  No acute osseous changes.      Impression: 1. Hazy opacities in the right infrahilar and left retrocardiac  regions are consistent with multifocal pneumonia and atelectasis seen  on recent chest CT.  2. Cardiomegaly.               MACRO:  None      Signed by: Claudette Vázquez 10/26/2024 2:33 PM  Dictation workstation:   [id number]      Physical Exam    General: Awake, alert/oriented x4, well developed, no acute distress  Head: Atraumatic/Normocephalic  Eyes: Normal external exam  ENT: Oropharynx normal. Uvula midline, no tonsillar edema, moist mucous membranes  Cardiovascular: RRR, S1/S2, no murmurs, rubs, or gallops, radial pulses +2, no edema of extremities  Pulmonary: CTAB, no respiratory distress. No wheezes, rales, or ronchi  Abdomen: +BS, soft, non-tender, nondistended, no guarding or rebound, no masses noted  Skin- No lesions, contusions, or erythema.  Neuro: Alert/oriented x4, no focal motor or sensory deficits    Relevant Results               Assessment/Plan                  Assessment & Plan    Ms. Joana Morales is a 84 y.o. female who presents with a primary complaint of dry cough whose PMH is significant for COPD on nocturnal 2L NC, IDDM II, paroxsymal afib on Coumadin, HTN, HLD, and GERD.  In the emergency room, patient was hemodynamically stable but required 4 L NC compared to her home nocturnal 2 L NC.  Labs in the ED remarkable for leukocytosis of 13.  CT angio chest showed multifocal pneumonia in the posterior RUL and posterior LLL.       #Acute on chronic hypoxic respiratory failure 2/2 multifocal pneumonia (posterior RUL and posterior LLL), resolved  - Legionella, strep pneumo urine antigens are negative  -Initially started on Rocephin 2 g IV daily until 10/27 and Zithromax 500 mg p.o. until 10/25  -Transitioned to Augmentin through 10/29 to complete 5-day course  - Wean O2 as tolerated, maintain SpO2 >92%  - Encourage incentive spirometry use  - Duonebs q2h prn  - Tessalon Perles every 8 hours as needed for coughs     #IDDM II  -On Lispro sliding scale #2  -On glargine 50 units at home, has been on 40 units during this hospital stay  -Intermittently hypoglycemic.  Will reduce glargine to 30 units and obtain hemoglobin  "A1c  -Would recommend stopping glipizide on disposition due to propensity for causing hypoglycemia     #A-fib on Coumadin  - Warfarin restarted  - Will follow INR     CHRONIC PROBLEMS:  # HTN  # HLD  # GERD  - Will Continue home medications as appropriate  -Increase losartan to 50 mg daily due to hypertension.    -Continue amlodipine 10 mg and metoprolol 100 mg twice daily           Global Plan of Care  Fluid: As needed  Nutrition: Consistent carb   DVT Prophylaxis: Warfarin restarted  GI Prophylaxis: None  Code Status: Full Code     Dispo: Peer to peer to be done today at 1200 for approval for placement     Emergency Contact: Extended Emergency Contact Information  Primary Emergency Contact: KEVANWENDYDANICA  Mobile Phone: [phone]  Relation: Daughter  Preferred language: English   needed? No        This note was partially created using voice recognition software and is inherently subject to errors including those of syntax and \"sound-alike\" substitutions which may escape proofreading. In such instances, original meaning may be extrapolated by contextual derivation     Assessment and plan discussed with attending  Devika Addison, DO  Internal medicine, PGY 3    "

## 2024-10-30 ENCOUNTER — NURSING HOME VISIT (OUTPATIENT)
Dept: POST ACUTE CARE | Facility: EXTERNAL LOCATION | Age: 84
End: 2024-10-30
Payer: MEDICARE

## 2024-10-30 DIAGNOSIS — I50.42 CHRONIC COMBINED SYSTOLIC AND DIASTOLIC CONGESTIVE HEART FAILURE: ICD-10-CM

## 2024-10-30 DIAGNOSIS — Z79.01 CURRENT USE OF LONG TERM ANTICOAGULATION: ICD-10-CM

## 2024-10-30 DIAGNOSIS — J44.9 CHRONIC OBSTRUCTIVE PULMONARY DISEASE, UNSPECIFIED COPD TYPE (MULTI): ICD-10-CM

## 2024-10-30 DIAGNOSIS — J18.9 PNEUMONIA DUE TO INFECTIOUS ORGANISM, UNSPECIFIED LATERALITY, UNSPECIFIED PART OF LUNG: Primary | ICD-10-CM

## 2024-10-30 DIAGNOSIS — M81.0 OSTEOPOROSIS WITHOUT CURRENT PATHOLOGICAL FRACTURE, UNSPECIFIED OSTEOPOROSIS TYPE: ICD-10-CM

## 2024-10-30 DIAGNOSIS — F41.1 GAD (GENERALIZED ANXIETY DISORDER): ICD-10-CM

## 2024-10-30 DIAGNOSIS — I48.91 ATRIAL FIBRILLATION, UNSPECIFIED TYPE (MULTI): ICD-10-CM

## 2024-10-30 DIAGNOSIS — R53.1 WEAKNESS: ICD-10-CM

## 2024-10-30 DIAGNOSIS — G47.00 INSOMNIA, UNSPECIFIED TYPE: ICD-10-CM

## 2024-10-30 DIAGNOSIS — I10 HYPERTENSION, ESSENTIAL: ICD-10-CM

## 2024-10-30 DIAGNOSIS — Z79.4 TYPE 2 DIABETES MELLITUS WITH HYPERGLYCEMIA, WITH LONG-TERM CURRENT USE OF INSULIN: ICD-10-CM

## 2024-10-30 DIAGNOSIS — E11.65 TYPE 2 DIABETES MELLITUS WITH HYPERGLYCEMIA, WITH LONG-TERM CURRENT USE OF INSULIN: ICD-10-CM

## 2024-10-30 DIAGNOSIS — H34.8120 CENTRAL RETINAL VEIN OCCLUSION WITH MACULAR EDEMA OF LEFT EYE: ICD-10-CM

## 2024-10-30 DIAGNOSIS — I80.9 THROMBOPHLEBITIS: ICD-10-CM

## 2024-10-30 DIAGNOSIS — E78.5 HYPERLIPIDEMIA, UNSPECIFIED HYPERLIPIDEMIA TYPE: ICD-10-CM

## 2024-10-30 DIAGNOSIS — F32.A DEPRESSION, UNSPECIFIED DEPRESSION TYPE: ICD-10-CM

## 2024-10-30 PROCEDURE — 99306 1ST NF CARE HIGH MDM 50: CPT | Performed by: FAMILY MEDICINE

## 2024-10-30 NOTE — LETTER
Patient: Joana Morales  : 1940    Encounter Date: 10/30/2024    Documentation at [facility]    Problem List Items Addressed This Visit       Weakness    Chronic combined systolic and diastolic congestive heart failure    Central retinal vein occlusion with macular edema of left eye    Thrombophlebitis    Type 2 diabetes mellitus with hyperglycemia, with long-term current use of insulin    Depression    Hypertension, essential    Atrial fibrillation (Multi)    Chronic obstructive pulmonary disease (Multi)    Hyperlipidemia    Current use of long term anticoagulation    Osteoporosis without current pathological fracture    HYUN (generalized anxiety disorder)    Insomnia     Other Visit Diagnoses       Pneumonia due to infectious organism, unspecified laterality, unspecified part of lung    -  Primary          From [facility]  Spoke with dtr      Electronically Signed By: Harvey Sher DO   24 10:00 PM

## 2024-10-30 NOTE — DOCUMENTATION CLARIFICATION NOTE
"    PATIENT:               STANISLAV BELLO  ACCT #:                  [id number]  MRN:                       [id number]  :                       1940  ADMIT DATE:       10/23/2024 1:49 PM  DISCH DATE:        10/29/2024 3:06 PM  RESPONDING PROVIDER #:        [id number]          PROVIDER RESPONSE TEXT:    Coagulopathy 2/2 Coumadin and IV antibiotics    CDI QUERY TEXT:    Clarification    Instruction:    Based on your assessment of the patient and the clinical information, please provide the requested documentation by clicking on the appropriate radio button and enter any additional information if prompted.    Question: Is there a diagnosis indicative of the clinical findings and patient symptoms    When answering this query, please exercise your independent professional judgment. The fact that a question is being asked, does not imply that any particular answer is desired or expected.    The patient's clinical indicators include:  Clinical Information: 85 y/o F admitted with Pneumonia    Clinical Indicators:    INR:  10/24/24 05:26: 2.9  10/25/24 06:44: 4.8  10/26/24 07:14: 3.8  10/27/24 06:44: 2.7  10/28/24 07:17: 2.2    ED note 10/23 DR. Andre: \"presents to the ER for 3 day history of cough.  Patient comes to the ED from her assisted living facility.  Patient reports that she is normally on 2 L of oxygen in her nursing facility but here in the ED required 4 L of oxygen. CT angio of the chest showed multifocal pneumonia in the posterior right upper lobe and posterior left lower lobe.\"    H/P 10/24 Dr. Doyle: \"Today's labs reviewed, INR of 2.9 which is therapeutic\"    MD PN 10/25 DR. Figueroa:  \"Her INR was elevated at 4.8 this morning, her home Coumadin is on hold, suspect elevated INR secondary to the antibiotics. \"    D/C summary 10/26 Dr. Figueroa: \"Warfarin on Hold because of high INR (3.8) Until 10/28/2024.  Warfarin is on hold because of high INR ( 4.8->3.8) and the high INR was attributed to be because of the antibiotics " "she was taking for the pneumonia.\"    MD PN 10/27 Dr. Figueroa/[place]:  \"Warfarin restarted since INR is down to 2.7.  Her INR has down trended to 2.7, will resume her home Coumadin at this time.\"    MD PN 10/28, 10/29 Dr. Figueroa: \"INR is down to 2.2, home Coumadin is resumed.  She will need a repeat INR in a few days.  She is finishing up course of Augmentin for her pneumonia.  \"    Treatment: INR, hold coumadin    Risk Factors: h/o A.Fib on chronic coumadin use, elevated INR, IV rocephin daily, IV zithromax daily  Options provided:  -- Coagulopathy 2/2 Coumadin  -- Coagulopathy 2/2 IV antibiotics  -- Coagulopathy 2/2 Coumadin and IV antibiotics  -- Other - I will add my own diagnosis  -- Refer to Clinical Documentation Reviewer    Query created by: Tasneem Mcgregor on 10/28/2024 9:08 AM      Electronically signed by:  AKY COOK MD 10/30/2024 11:29 AM          "

## 2024-11-02 LAB
ATRIAL RATE: 77 BPM
Q ONSET: 215 MS
QRS COUNT: 11 BEATS
QRS DURATION: 102 MS
QT INTERVAL: 408 MS
QTC CALCULATION(BAZETT): 443 MS
QTC FREDERICIA: 431 MS
R AXIS: 43 DEGREES
T AXIS: 246 DEGREES
T OFFSET: 419 MS
VENTRICULAR RATE: 71 BPM

## 2024-11-05 PROBLEM — I80.9 THROMBOPHLEBITIS: Status: ACTIVE | Noted: 2024-11-05

## 2024-11-05 PROBLEM — I50.42 CHRONIC COMBINED SYSTOLIC AND DIASTOLIC CONGESTIVE HEART FAILURE: Status: ACTIVE | Noted: 2024-11-05

## 2024-11-05 PROBLEM — Z79.01 CURRENT USE OF LONG TERM ANTICOAGULATION: Status: ACTIVE | Noted: 2024-11-05

## 2024-11-05 PROBLEM — I48.91 ATRIAL FIBRILLATION (MULTI): Status: ACTIVE | Noted: 2024-11-05

## 2024-11-05 PROBLEM — I10 HYPERTENSION, ESSENTIAL: Status: ACTIVE | Noted: 2024-11-05

## 2024-11-05 PROBLEM — E11.65 TYPE 2 DIABETES MELLITUS WITH HYPERGLYCEMIA, WITH LONG-TERM CURRENT USE OF INSULIN: Status: ACTIVE | Noted: 2024-11-05

## 2024-11-05 PROBLEM — Z79.4 TYPE 2 DIABETES MELLITUS WITH HYPERGLYCEMIA, WITH LONG-TERM CURRENT USE OF INSULIN: Status: ACTIVE | Noted: 2024-11-05

## 2024-11-05 PROBLEM — G47.00 INSOMNIA: Status: ACTIVE | Noted: 2024-11-05

## 2024-11-05 PROBLEM — J44.9 CHRONIC OBSTRUCTIVE PULMONARY DISEASE (MULTI): Status: ACTIVE | Noted: 2024-11-05

## 2024-11-05 PROBLEM — F41.1 GAD (GENERALIZED ANXIETY DISORDER): Status: ACTIVE | Noted: 2024-11-05

## 2024-11-05 PROBLEM — H34.8120 CENTRAL RETINAL VEIN OCCLUSION WITH MACULAR EDEMA OF LEFT EYE: Status: ACTIVE | Noted: 2024-11-05

## 2024-11-05 PROBLEM — F32.A DEPRESSION: Status: ACTIVE | Noted: 2024-11-05

## 2024-11-05 PROBLEM — E78.5 HYPERLIPIDEMIA: Status: ACTIVE | Noted: 2024-11-05

## 2024-11-05 PROBLEM — M81.0 OSTEOPOROSIS WITHOUT CURRENT PATHOLOGICAL FRACTURE: Status: ACTIVE | Noted: 2024-11-05

## 2024-11-06 NOTE — PROGRESS NOTES
Documentation at [facility]    Problem List Items Addressed This Visit       Weakness    Chronic combined systolic and diastolic congestive heart failure    Central retinal vein occlusion with macular edema of left eye    Thrombophlebitis    Type 2 diabetes mellitus with hyperglycemia, with long-term current use of insulin    Depression    Hypertension, essential    Atrial fibrillation (Multi)    Chronic obstructive pulmonary disease (Multi)    Hyperlipidemia    Current use of long term anticoagulation    Osteoporosis without current pathological fracture    HYUN (generalized anxiety disorder)    Insomnia     Other Visit Diagnoses       Pneumonia due to infectious organism, unspecified laterality, unspecified part of lung    -  Primary          From [facility]  Spoke with dtr

## 2024-11-16 ENCOUNTER — NURSING HOME VISIT (OUTPATIENT)
Dept: POST ACUTE CARE | Facility: EXTERNAL LOCATION | Age: 84
End: 2024-11-16
Payer: MEDICARE

## 2024-11-16 DIAGNOSIS — I48.91 ATRIAL FIBRILLATION, UNSPECIFIED TYPE (MULTI): ICD-10-CM

## 2024-11-16 DIAGNOSIS — R79.1 SUPRATHERAPEUTIC INTERNATIONAL NORMALIZED RATIO (INR): Primary | ICD-10-CM

## 2024-11-16 DIAGNOSIS — M17.0 OSTEOARTHRITIS OF BOTH KNEES, UNSPECIFIED OSTEOARTHRITIS TYPE: ICD-10-CM

## 2024-11-16 DIAGNOSIS — J18.9 PNEUMONIA DUE TO INFECTIOUS ORGANISM, UNSPECIFIED LATERALITY, UNSPECIFIED PART OF LUNG: ICD-10-CM

## 2024-11-16 DIAGNOSIS — Z79.4 TYPE 2 DIABETES MELLITUS WITH HYPERGLYCEMIA, WITH LONG-TERM CURRENT USE OF INSULIN: ICD-10-CM

## 2024-11-16 DIAGNOSIS — E11.65 TYPE 2 DIABETES MELLITUS WITH HYPERGLYCEMIA, WITH LONG-TERM CURRENT USE OF INSULIN: ICD-10-CM

## 2024-11-16 PROCEDURE — 99309 SBSQ NF CARE MODERATE MDM 30: CPT | Performed by: FAMILY MEDICINE

## 2024-11-16 NOTE — LETTER
Patient: Joana Morales  : 1940    Encounter Date: 2024    Documentation at [facility]    Problem List Items Addressed This Visit       Pneumonia due to infectious organism    Supratherapeutic international normalized ratio (INR) - Primary    Type 2 diabetes mellitus with hyperglycemia, with long-term current use of insulin    Atrial fibrillation (Multi)    Osteoarthritis of both knees     Resuming coumadin at lower dose (5 mg daily)  Continue q Monday, Thursday INR    DM - adding Actos and Jardiance    Xray of knee = DJD.  Celebrex started by NP      Electronically Signed By: Harvey Sher DO   24 10:01 PM

## 2024-11-17 PROBLEM — M17.0 OSTEOARTHRITIS OF BOTH KNEES: Status: ACTIVE | Noted: 2024-11-17

## 2024-11-18 NOTE — PROGRESS NOTES
Documentation at [facility]    Problem List Items Addressed This Visit       Pneumonia due to infectious organism    Supratherapeutic international normalized ratio (INR) - Primary    Type 2 diabetes mellitus with hyperglycemia, with long-term current use of insulin    Atrial fibrillation (Multi)    Osteoarthritis of both knees     Resuming coumadin at lower dose (5 mg daily)  Continue q Monday, Thursday INR    DM - adding Actos and Jardiance    Xray of knee = DJD.  Celebrex started by NP

## 2025-02-23 ENCOUNTER — APPOINTMENT (OUTPATIENT)
Dept: CARDIOLOGY | Facility: HOSPITAL | Age: 85
End: 2025-02-23
Payer: MEDICARE

## 2025-02-23 ENCOUNTER — APPOINTMENT (OUTPATIENT)
Dept: RADIOLOGY | Facility: HOSPITAL | Age: 85
End: 2025-02-23
Payer: MEDICARE

## 2025-02-23 ENCOUNTER — HOSPITAL ENCOUNTER (EMERGENCY)
Facility: HOSPITAL | Age: 85
Discharge: HOME | End: 2025-02-23
Attending: EMERGENCY MEDICINE
Payer: MEDICARE

## 2025-02-23 VITALS
TEMPERATURE: 97 F | WEIGHT: 168 LBS | SYSTOLIC BLOOD PRESSURE: 176 MMHG | HEART RATE: 51 BPM | DIASTOLIC BLOOD PRESSURE: 86 MMHG | BODY MASS INDEX: 27.99 KG/M2 | OXYGEN SATURATION: 96 % | HEIGHT: 65 IN | RESPIRATION RATE: 18 BRPM

## 2025-02-23 DIAGNOSIS — E11.649: Primary | ICD-10-CM

## 2025-02-23 DIAGNOSIS — I48.91 ATRIAL FIBRILLATION WITH SLOW VENTRICULAR RESPONSE (MULTI): ICD-10-CM

## 2025-02-23 DIAGNOSIS — E83.42 HYPOMAGNESEMIA: ICD-10-CM

## 2025-02-23 LAB
ALBUMIN SERPL BCP-MCNC: 3.8 G/DL (ref 3.4–5)
ALP SERPL-CCNC: 52 U/L (ref 33–136)
ALT SERPL W P-5'-P-CCNC: 12 U/L (ref 7–45)
ANION GAP SERPL CALC-SCNC: 9 MMOL/L (ref 10–20)
APPEARANCE UR: CLEAR
AST SERPL W P-5'-P-CCNC: 13 U/L (ref 9–39)
BASOPHILS # BLD AUTO: 0.03 X10*3/UL (ref 0–0.1)
BASOPHILS NFR BLD AUTO: 0.4 %
BILIRUB SERPL-MCNC: 0.7 MG/DL (ref 0–1.2)
BILIRUB UR STRIP.AUTO-MCNC: NEGATIVE MG/DL
BUN SERPL-MCNC: 21 MG/DL (ref 6–23)
CALCIUM SERPL-MCNC: 9.1 MG/DL (ref 8.6–10.3)
CARDIAC TROPONIN I PNL SERPL HS: 6 NG/L (ref 0–13)
CARDIAC TROPONIN I PNL SERPL HS: 8 NG/L (ref 0–13)
CHLORIDE SERPL-SCNC: 101 MMOL/L (ref 98–107)
CO2 SERPL-SCNC: 31 MMOL/L (ref 21–32)
COLOR UR: ABNORMAL
CREAT SERPL-MCNC: 0.94 MG/DL (ref 0.5–1.05)
DIGOXIN SERPL-MCNC: 0.72 NG/ML (ref 0.8–?)
EGFRCR SERPLBLD CKD-EPI 2021: 59 ML/MIN/1.73M*2
EOSINOPHIL # BLD AUTO: 0.17 X10*3/UL (ref 0–0.4)
EOSINOPHIL NFR BLD AUTO: 2.3 %
ERYTHROCYTE [DISTWIDTH] IN BLOOD BY AUTOMATED COUNT: 16.2 % (ref 11.5–14.5)
GLUCOSE BLD MANUAL STRIP-MCNC: 113 MG/DL (ref 74–99)
GLUCOSE BLD MANUAL STRIP-MCNC: 204 MG/DL (ref 74–99)
GLUCOSE BLD MANUAL STRIP-MCNC: 70 MG/DL (ref 74–99)
GLUCOSE SERPL-MCNC: 90 MG/DL (ref 74–99)
GLUCOSE UR STRIP.AUTO-MCNC: ABNORMAL MG/DL
HCT VFR BLD AUTO: 42.9 % (ref 36–46)
HGB BLD-MCNC: 13.3 G/DL (ref 12–16)
HOLD SPECIMEN: NORMAL
IMM GRANULOCYTES # BLD AUTO: 0.02 X10*3/UL (ref 0–0.5)
IMM GRANULOCYTES NFR BLD AUTO: 0.3 % (ref 0–0.9)
INR PPP: 4.8 (ref 0.9–1.1)
KETONES UR STRIP.AUTO-MCNC: NEGATIVE MG/DL
LACTATE SERPL-SCNC: 1.3 MMOL/L (ref 0.4–2)
LEUKOCYTE ESTERASE UR QL STRIP.AUTO: NEGATIVE
LYMPHOCYTES # BLD AUTO: 1 X10*3/UL (ref 0.8–3)
LYMPHOCYTES NFR BLD AUTO: 13.7 %
MAGNESIUM SERPL-MCNC: 1.59 MG/DL (ref 1.6–2.4)
MCH RBC QN AUTO: 26.4 PG (ref 26–34)
MCHC RBC AUTO-ENTMCNC: 31 G/DL (ref 32–36)
MCV RBC AUTO: 85 FL (ref 80–100)
MONOCYTES # BLD AUTO: 0.58 X10*3/UL (ref 0.05–0.8)
MONOCYTES NFR BLD AUTO: 7.9 %
NEUTROPHILS # BLD AUTO: 5.51 X10*3/UL (ref 1.6–5.5)
NEUTROPHILS NFR BLD AUTO: 75.4 %
NITRITE UR QL STRIP.AUTO: NEGATIVE
NRBC BLD-RTO: 0 /100 WBCS (ref 0–0)
PH UR STRIP.AUTO: 5.5 [PH]
PLATELET # BLD AUTO: 188 X10*3/UL (ref 150–450)
POTASSIUM SERPL-SCNC: 3.8 MMOL/L (ref 3.5–5.3)
PROT SERPL-MCNC: 6.3 G/DL (ref 6.4–8.2)
PROT UR STRIP.AUTO-MCNC: NEGATIVE MG/DL
PROTHROMBIN TIME: 55.2 SECONDS (ref 9.8–12.8)
RBC # BLD AUTO: 5.03 X10*6/UL (ref 4–5.2)
RBC # UR STRIP.AUTO: NEGATIVE MG/DL
SODIUM SERPL-SCNC: 137 MMOL/L (ref 136–145)
SP GR UR STRIP.AUTO: 1.01
UROBILINOGEN UR STRIP.AUTO-MCNC: NORMAL MG/DL
WBC # BLD AUTO: 7.3 X10*3/UL (ref 4.4–11.3)

## 2025-02-23 PROCEDURE — 2500000001 HC RX 250 WO HCPCS SELF ADMINISTERED DRUGS (ALT 637 FOR MEDICARE OP)

## 2025-02-23 PROCEDURE — 71045 X-RAY EXAM CHEST 1 VIEW: CPT

## 2025-02-23 PROCEDURE — 93005 ELECTROCARDIOGRAM TRACING: CPT

## 2025-02-23 PROCEDURE — 83735 ASSAY OF MAGNESIUM: CPT

## 2025-02-23 PROCEDURE — 99285 EMERGENCY DEPT VISIT HI MDM: CPT | Mod: 25 | Performed by: EMERGENCY MEDICINE

## 2025-02-23 PROCEDURE — 71045 X-RAY EXAM CHEST 1 VIEW: CPT | Mod: FOREIGN READ | Performed by: RADIOLOGY

## 2025-02-23 PROCEDURE — 2500000004 HC RX 250 GENERAL PHARMACY W/ HCPCS (ALT 636 FOR OP/ED)

## 2025-02-23 PROCEDURE — 36415 COLL VENOUS BLD VENIPUNCTURE: CPT

## 2025-02-23 PROCEDURE — 84484 ASSAY OF TROPONIN QUANT: CPT

## 2025-02-23 PROCEDURE — 85025 COMPLETE CBC W/AUTO DIFF WBC: CPT

## 2025-02-23 PROCEDURE — 81003 URINALYSIS AUTO W/O SCOPE: CPT

## 2025-02-23 PROCEDURE — 82947 ASSAY GLUCOSE BLOOD QUANT: CPT | Mod: 59

## 2025-02-23 PROCEDURE — 80053 COMPREHEN METABOLIC PANEL: CPT

## 2025-02-23 PROCEDURE — 85610 PROTHROMBIN TIME: CPT

## 2025-02-23 PROCEDURE — 80162 ASSAY OF DIGOXIN TOTAL: CPT

## 2025-02-23 PROCEDURE — 83605 ASSAY OF LACTIC ACID: CPT

## 2025-02-23 RX ORDER — LOSARTAN POTASSIUM 50 MG/1
50 TABLET ORAL ONCE
Status: COMPLETED | OUTPATIENT
Start: 2025-02-23 | End: 2025-02-23

## 2025-02-23 RX ORDER — LANOLIN ALCOHOL/MO/W.PET/CERES
400 CREAM (GRAM) TOPICAL ONCE
Status: COMPLETED | OUTPATIENT
Start: 2025-02-23 | End: 2025-02-23

## 2025-02-23 RX ADMIN — Medication 400 MG: at 06:56

## 2025-02-23 RX ADMIN — LOSARTAN POTASSIUM 50 MG: 50 TABLET, FILM COATED ORAL at 08:42

## 2025-02-23 ASSESSMENT — COLUMBIA-SUICIDE SEVERITY RATING SCALE - C-SSRS
6. HAVE YOU EVER DONE ANYTHING, STARTED TO DO ANYTHING, OR PREPARED TO DO ANYTHING TO END YOUR LIFE?: NO
1. IN THE PAST MONTH, HAVE YOU WISHED YOU WERE DEAD OR WISHED YOU COULD GO TO SLEEP AND NOT WAKE UP?: NO
2. HAVE YOU ACTUALLY HAD ANY THOUGHTS OF KILLING YOURSELF?: NO

## 2025-02-23 ASSESSMENT — PAIN - FUNCTIONAL ASSESSMENT: PAIN_FUNCTIONAL_ASSESSMENT: 0-10

## 2025-02-23 ASSESSMENT — PAIN SCALES - GENERAL
PAINLEVEL_OUTOF10: 0 - NO PAIN
PAINLEVEL_OUTOF10: 0 - NO PAIN

## 2025-02-23 NOTE — PROGRESS NOTES
Emergency Medicine Transition of Care Note.    I received Joana Morales in signout from Dr. Santana.  Please see the previous ED provider note for all HPI, PE and MDM up to the time of signout at 0700. This is in addition to the primary record.    In brief Joana Morales is an 84 y.o. female presenting for   Chief Complaint   Patient presents with   • Altered Mental Status     Per EMS Pt's BG was 53 at 0430 at [facility] with confusion     At the time of signout we were awaiting: EKG, urinalysis, repeat point-of-care blood glucose to rule out urinary tract infection.  Patient reportedly alert and oriented x 2 at baseline.  Additionally waiting digoxin level.  Patient did arrive with hypoglycemia history of taking Lantus and atrial fibrillation with slow ventricular response.  Patient is on Coumadin, recently had Coumadin held per nursing home report due to elevated INR.  Please see prior emergency physician resident note for complete medical decision making and physical exam and history of present illness up to this time.    MDM:    ED Course as of 02/23/25 0943   Sun Feb 23, 2025   0515 84-year-old female with a history of A-fib on Coumadin, IDDM 2 on Lantus 100 units and Jardiance, Hypoxia on 2L NC at bedtime, CKD, CHF, and mitral valve insufficiency presenting to the ED with complaint of hypoglycemia.  Patient was awoken this morning at approximately 4:20 AM at her facility and noted she was groaning, shaking with her arms contracted and left eye twitching for approximately 20 minutes.  It took her blood sugar level and found it was 53 and gave her glucagon at 4:40 AM.  EMS transported her and she gradually regained awareness.  Reports she is AO x 2 at baseline.  Report from nursing says that she had her Coumadin held due to supratherapeutic INR 2 days ago and was recently started on Tamiflu as a facility precaution due to spread.  She says she feels like this is a dream.     She is Aox3, following  "commands, and speaking in full sentences.  [ES]   0637 MAGNESIUM(!): 1.59  Replaced [ES]   0638 Patient receives Lantus at 8pm per [facility] nurse. [ES]   0644 Patient and daughter updated on findings and lack therof who reports that she also does not think she is eating as much which may have contributed to her hypoglycemia. [ES]   0647 We will repeat EKG, glucose, obtain INR due to supratherapeutic lab 3 days ago, and obtain urinalysis. [ES]   0817 Patient's daughter is at bedside, states the patient is her normal self, does not have any concern for altered mental status.  Patient states she has no headaches, has no complaints.  The patient additionally states she would \"like a take at home\".  Patient given 50 mg of her home dose of losartan due to hypertension.  Repeat Accu-Chek ordered. [PV]   0823 Additionally the patient takes 40 mg furosemide for COPD at her facility, amlodipine 10 mg, metoprolol 100 mg metoprolol extended release, metoprolol succinate.  Amlodipine and metoprolol held due to atrial fibrillation with a mildly bradycardic response, heart rate between 50-60. [PV]   0938 Patient's blood pressure has improved to 183/79 after administration of losartan.  Urinalysis shows no signs of urinary tract infection.  CBC CMP unremarkable, troponin and delta troponin creatinine for negative lactate 1.3.  Patient this time states that she feels well, the patient's daughter at bedside states the patient is at her normal baseline.  Pending repeat blood glucose. [PV]   0942 Repeat blood glucose 113.  Patient and daughter amenable for discharge, return precautions discussed. [PV]      ED Course User Index  [ES] Mauro Santana MD  [PV] Polo Pedraza DO         Diagnoses as of 02/23/25 0943   Hypoglycemic event in diabetes (Multi)   Hypomagnesemia   Atrial fibrillation with slow ventricular response (Multi)       Polo Pedraza DO   "

## 2025-02-23 NOTE — DISCHARGE INSTRUCTIONS
Seek immediate medical attention if you develop: worsening confusion, headache, nausea, vomiting, confusion, weakness, loss of motion in your arms or legs, loss of control of your urine or stool, difficulty waking from sleep, neck pain, fever, or any new or worsening symptoms.    Please check your glucose (sugar level) every 2 hours today.

## 2025-02-23 NOTE — ED PROVIDER NOTES
HPI   Chief Complaint   Patient presents with    Altered Mental Status     Per EMS Pt's BG was 53 at 0430 at [facility] with confusion       This is an 84 years old female patient presented to the emergency department from nursing home by squad with a chief complaint of altered mental status/confusion and hypoglycemia.  Stated that she takes Lantus at home.  She is on Coumadin for A-fib.  She is also on digoxin.  Patient denies any headaches, lightheadedness, dizziness, was alert and oriented x 4 during my assessment, denies chest pain, shortness of breath, cough, abdominal pain, diarrhea, constipation, urinary symptoms, weakness, or focal numbness.    Review of system: As stated above in the HPI section.              Patient History   Past Medical History:   Diagnosis Date    Atrial fibrillation (Multi) 11/05/2024    Central retinal vein occlusion, left eye, stable (CMS-HCC) 03/23/2017    Central retinal vein occlusion of left eye    Central retinal vein occlusion, unspecified eye, stable (CMS-HCC) 05/12/2016    CRVO (central retinal vein occlusion)    Chronic combined systolic and diastolic congestive heart failure 11/05/2024    Chronic obstructive pulmonary disease (Multi) 11/05/2024    Current use of long term anticoagulation 11/05/2024    Depression 11/05/2024    Facility-acquired pneumonia 10/24/2024    HYUN (generalized anxiety disorder) 11/05/2024    Hyperlipidemia 11/05/2024    Hypertension, essential 11/05/2024    Insomnia 11/05/2024    Osteoporosis without current pathological fracture 11/05/2024    Personal history of other endocrine, nutritional and metabolic disease 02/23/2016    History of diabetes mellitus    Pneumonia of both lungs due to infectious organism, unspecified part of lung 10/24/2024    Type 2 diabetes mellitus with hyperglycemia, with long-term current use of insulin 11/05/2024    Type 2 diabetes mellitus with mild nonproliferative 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,  left eye 05/12/2016    Non-proliferative diabetic retinopathy, left eye    Unequal limb length (acquired), unspecified site 08/09/2018    Acquired leg length discrepancy     Past Surgical History:   Procedure Laterality Date    OTHER SURGICAL HISTORY  02/23/2016    History Of Prior Surgery     No family history on file.  Social History     Tobacco Use    Smoking status: Never    Smokeless tobacco: Never   Vaping Use    Vaping status: Never Used   Substance Use Topics    Alcohol use: Defer    Drug use: Never       Physical Exam   ED Triage Vitals [02/23/25 0501]   Temperature Heart Rate Respirations BP   36.1 °C (97 °F) 65 16 143/67      Pulse Ox Temp Source Heart Rate Source Patient Position   (!) 92 % Temporal Monitor Sitting      BP Location FiO2 (%)     Right arm --       Physical Exam  Vitals and nursing note reviewed.   Constitutional:       General: She is not in acute distress.     Appearance: She is well-developed.   HENT:      Head: Normocephalic and atraumatic.   Eyes:      Conjunctiva/sclera: Conjunctivae normal.   Cardiovascular:      Rate and Rhythm: Normal rate and regular rhythm.      Heart sounds: No murmur heard.  Pulmonary:      Effort: Pulmonary effort is normal. No respiratory distress.      Breath sounds: Normal breath sounds.   Abdominal:      Palpations: Abdomen is soft.      Tenderness: There is no abdominal tenderness.   Musculoskeletal:         General: No swelling.      Cervical back: Neck supple.   Skin:     General: Skin is warm and dry.      Capillary Refill: Capillary refill takes less than 2 seconds.   Neurological:      Mental Status: She is alert and oriented to person, place, and time. Mental status is at baseline.      GCS: GCS eye subscore is 4. GCS verbal subscore is 5. GCS motor subscore is 6.   Psychiatric:         Mood and Affect: Mood normal.           ED Course & MDM   ED Course as of 02/23/25 0651   Sun Feb 23, 2025   0515 84-year-old female with a history of A-fib on  Coumadin, IDDM 2 on Lantus 100 units and Jardiance, Hypoxia on 2L NC at bedtime, CKD, CHF, and mitral valve insufficiency presenting to the ED with complaint of hypoglycemia.  Patient was awoken this morning at approximately 4:20 AM at her facility and noted she was groaning, shaking with her arms contracted and left eye twitching for approximately 20 minutes.  It took her blood sugar level and found it was 53 and gave her glucagon at 4:40 AM.  EMS transported her and she gradually regained awareness.  Reports she is AO x 2 at baseline.  Report from nursing says that she had her Coumadin held due to supratherapeutic INR 2 days ago and was recently started on Tamiflu as a facility precaution due to spread.  She says she feels like this is a dream.     She is Aox3, following commands, and speaking in full sentences.  [ES]   0637 MAGNESIUM(!): 1.59  Replaced [ES]   0638 Patient receives Lantus at 8pm per [facility] nurse. [ES]   0644 Patient and daughter updated on findings and lack therof who reports that she also does not think she is eating as much which may have contributed to her hypoglycemia. [ES]   0647 We will repeat EKG, glucose, obtain INR due to supratherapeutic lab 3 days ago, and obtain urinalysis. [ES]      ED Course User Index  [ES] Mauro Santana MD         Diagnoses as of 02/23/25 0651   Hypoglycemic event in diabetes (Multi)   Hypomagnesemia   Atrial fibrillation with slow ventricular response (Multi)                 No data recorded     Anastasiia Coma Scale Score: 15 (02/23/25 0514 : aBrbara Agustin RN)                           Medical Decision Making  Patient seen and examined, will obtain basic labs, urine analysis, digoxin level, coagulation profile, and will monitor the patient glucose level.  Repeat glucose level at 0649 which is 11 hours after receiving her Lantus is reassuring and it is 208 mg/dl.  No recurrent hypoglycemic episodes during the ED observation.  Stage II kidney disease is  noted.    Will signout the patient care at this point pending urine analysis, digoxin level, coagulation profile    EKG is interpreted by myself and revealed atrial fibrillation with slow ventricular response, rate is in the 40s, normal QRS and prolonged QTc duration.      I reviewed the resident/fellow's documentation and discussed the patient with the resident/fellow. I agree with the resident/fellow's medical decision making as documented in the note.     Tito Griffin DO         Procedure  Procedures     Tito Griffin DO  02/23/25 0614

## 2025-02-24 LAB
Q ONSET: 220 MS
Q ONSET: 222 MS
QRS COUNT: 6 BEATS
QRS COUNT: 8 BEATS
QRS DURATION: 90 MS
QRS DURATION: 96 MS
QT INTERVAL: 458 MS
QT INTERVAL: 738 MS
QTC CALCULATION(BAZETT): 405 MS
QTC CALCULATION(BAZETT): 601 MS
QTC FREDERICIA: 422 MS
QTC FREDERICIA: 644 MS
R AXIS: 36 DEGREES
R AXIS: 41 DEGREES
T AXIS: -10 DEGREES
T AXIS: 62 DEGREES
T OFFSET: 451 MS
T OFFSET: 589 MS
VENTRICULAR RATE: 40 BPM
VENTRICULAR RATE: 47 BPM

## 2025-02-25 ENCOUNTER — NURSING HOME VISIT (OUTPATIENT)
Dept: POST ACUTE CARE | Facility: EXTERNAL LOCATION | Age: 85
End: 2025-02-25
Payer: MEDICARE

## 2025-02-25 DIAGNOSIS — I10 HYPERTENSION, ESSENTIAL: ICD-10-CM

## 2025-02-25 DIAGNOSIS — E78.5 HYPERLIPIDEMIA, UNSPECIFIED HYPERLIPIDEMIA TYPE: ICD-10-CM

## 2025-02-25 DIAGNOSIS — R53.1 WEAKNESS: ICD-10-CM

## 2025-02-25 DIAGNOSIS — Z79.4 TYPE 2 DIABETES MELLITUS WITH HYPERGLYCEMIA, WITH LONG-TERM CURRENT USE OF INSULIN: ICD-10-CM

## 2025-02-25 DIAGNOSIS — E11.65 TYPE 2 DIABETES MELLITUS WITH HYPERGLYCEMIA, WITH LONG-TERM CURRENT USE OF INSULIN: ICD-10-CM

## 2025-02-25 DIAGNOSIS — I48.91 ATRIAL FIBRILLATION, UNSPECIFIED TYPE (MULTI): Primary | ICD-10-CM

## 2025-02-25 DIAGNOSIS — J44.9 CHRONIC OBSTRUCTIVE PULMONARY DISEASE, UNSPECIFIED COPD TYPE (MULTI): ICD-10-CM

## 2025-02-25 PROCEDURE — 99305 1ST NF CARE MODERATE MDM 35: CPT | Performed by: STUDENT IN AN ORGANIZED HEALTH CARE EDUCATION/TRAINING PROGRAM

## 2025-02-25 NOTE — LETTER
Mattie calls and states her prescription was not supposed to be mailed to pharmacy and that she always picks it up.  Mattie states she is out of her meds as of today and was told it will take about 1 week for her script to get to [facility].  Mattie wants to know if she can get a new script or what she is to do until this can get filled.   Patient: Joana Morales  : 1940    Encounter Date: 2025    Subjective  Patient ID: Joana Morales is a 84 y.o. female.    Patient is a 83-year-old female with past medical history of CKD, COPD, diabetes, A-fib, anxiety, depression, hyperlipidemia, ambulatory dysfunction who was recently transferred from another skilled nursing facility.  Patient resides at long-term care and states no acute issues or concerns at this time.  Otherwise, states that she is tolerating her medications.  Denies any issues with moving into a SNF.  Overall feels well otherwise.         Review of Systems   Constitutional:  Positive for fatigue.   HENT: Negative.     Respiratory: Negative.     Cardiovascular: Negative.    Gastrointestinal: Negative.    Musculoskeletal: Negative.    Neurological:  Positive for weakness.   Psychiatric/Behavioral: Negative.         ObjectiveVitals Reviewed via facility EMR   Physical Exam  Constitutional:       General: She is not in acute distress.     Appearance: She is not ill-appearing.   Eyes:      Pupils: Pupils are equal, round, and reactive to light.   Cardiovascular:      Rate and Rhythm: Normal rate and regular rhythm.      Pulses: Normal pulses.      Heart sounds: No murmur heard.  Pulmonary:      Effort: No respiratory distress.      Breath sounds: No wheezing.   Abdominal:      General: Abdomen is flat. Bowel sounds are normal. There is no distension.   Musculoskeletal:      Right lower leg: No edema.      Left lower leg: No edema.   Skin:     General: Skin is warm and dry.   Neurological:      Mental Status: She is alert. Mental status is at baseline.      Cranial Nerves: No cranial nerve deficit.      Motor: Weakness present.   Psychiatric:         Mood and Affect: Mood normal.         Behavior: Behavior normal.         Assessment/Plan  Diagnoses and all orders for this visit:  Atrial fibrillation, unspecified type (Multi)  Hyperlipidemia, unspecified hyperlipidemia type  Hypertension,  essential  Type 2 diabetes mellitus with hyperglycemia, with long-term current use of insulin  Chronic obstructive pulmonary disease, unspecified COPD type (Multi)  Weakness  Patient seen and examined at bedside.  Overall medically stable continue to monitor for worsening dementia and delirium.  Continue optimization of blood sugars, medications reviewed and reconciled.  Discussed care plan with staff and no issues noted.  Otherwise, continue supportive care, optimize activities of daily living.  Otherwise no additional issues at this time.     Reviewed and approved by ROBERT CRUM on 2/26/25 at 9:08 PM.         Electronically Signed By: Robert Crum DO   2/26/25  9:08 PM

## 2025-02-26 ASSESSMENT — ENCOUNTER SYMPTOMS
FATIGUE: 1
WEAKNESS: 1
GASTROINTESTINAL NEGATIVE: 1
PSYCHIATRIC NEGATIVE: 1
CARDIOVASCULAR NEGATIVE: 1
MUSCULOSKELETAL NEGATIVE: 1
RESPIRATORY NEGATIVE: 1

## 2025-03-19 ENCOUNTER — NURSING HOME VISIT (OUTPATIENT)
Dept: POST ACUTE CARE | Facility: EXTERNAL LOCATION | Age: 85
End: 2025-03-19
Payer: MEDICARE

## 2025-03-19 DIAGNOSIS — J44.9 CHRONIC OBSTRUCTIVE PULMONARY DISEASE, UNSPECIFIED COPD TYPE (MULTI): ICD-10-CM

## 2025-03-19 DIAGNOSIS — R73.9 HYPERGLYCEMIA: ICD-10-CM

## 2025-03-19 DIAGNOSIS — F41.1 GAD (GENERALIZED ANXIETY DISORDER): ICD-10-CM

## 2025-03-19 DIAGNOSIS — R53.1 WEAKNESS: ICD-10-CM

## 2025-03-19 DIAGNOSIS — E11.65 TYPE 2 DIABETES MELLITUS WITH HYPERGLYCEMIA, WITH LONG-TERM CURRENT USE OF INSULIN: ICD-10-CM

## 2025-03-19 DIAGNOSIS — I50.42 CHRONIC COMBINED SYSTOLIC AND DIASTOLIC CONGESTIVE HEART FAILURE: Primary | ICD-10-CM

## 2025-03-19 DIAGNOSIS — Z79.4 TYPE 2 DIABETES MELLITUS WITH HYPERGLYCEMIA, WITH LONG-TERM CURRENT USE OF INSULIN: ICD-10-CM

## 2025-03-19 DIAGNOSIS — E78.5 HYPERLIPIDEMIA, UNSPECIFIED HYPERLIPIDEMIA TYPE: ICD-10-CM

## 2025-03-19 PROCEDURE — 99309 SBSQ NF CARE MODERATE MDM 30: CPT | Performed by: STUDENT IN AN ORGANIZED HEALTH CARE EDUCATION/TRAINING PROGRAM

## 2025-03-19 ASSESSMENT — ENCOUNTER SYMPTOMS
MUSCULOSKELETAL NEGATIVE: 1
RESPIRATORY NEGATIVE: 1
WEAKNESS: 1
CARDIOVASCULAR NEGATIVE: 1
GASTROINTESTINAL NEGATIVE: 1
FATIGUE: 1
PSYCHIATRIC NEGATIVE: 1

## 2025-03-19 NOTE — LETTER
Patient: Joana Morales  : 1940    Encounter Date: 2025    Subjective  Patient ID: Joana Morales is a 84 y.o. female.    Patient seen and examined on routine evaluation.  She regards that she is overall doing well, and states that she is tolerating all her medications.  Endorses that she does intermittently get high blood sugars especially around dinnertime.  This is due to to her snacking throughout the day especially after lunch.  Otherwise, endorses no additional issues or concerns and overall feels well.         Review of Systems   Constitutional:  Positive for fatigue.   HENT: Negative.     Respiratory: Negative.     Cardiovascular: Negative.    Gastrointestinal: Negative.    Musculoskeletal: Negative.    Neurological:  Positive for weakness.   Psychiatric/Behavioral: Negative.         ObjectiveVitals Reviewed via facility EMR   Physical Exam  Constitutional:       General: She is not in acute distress.     Appearance: She is not ill-appearing.   Eyes:      Pupils: Pupils are equal, round, and reactive to light.   Cardiovascular:      Rate and Rhythm: Normal rate and regular rhythm.      Pulses: Normal pulses.      Heart sounds: No murmur heard.  Pulmonary:      Effort: No respiratory distress.      Breath sounds: No wheezing.   Abdominal:      General: Abdomen is flat. Bowel sounds are normal. There is no distension.   Musculoskeletal:      Right lower leg: No edema.      Left lower leg: No edema.   Skin:     General: Skin is warm and dry.   Neurological:      Mental Status: She is alert. Mental status is at baseline.      Cranial Nerves: No cranial nerve deficit.      Motor: Weakness present.   Psychiatric:         Mood and Affect: Mood normal.         Behavior: Behavior normal.         Assessment/Plan  Diagnoses and all orders for this visit:  Chronic combined systolic and diastolic congestive heart failure  Hyperlipidemia, unspecified hyperlipidemia type  Type 2 diabetes mellitus with  hyperglycemia, with long-term current use of insulin  HYUN (generalized anxiety disorder)  Chronic obstructive pulmonary disease, unspecified COPD type (Multi)  Weakness  Hyperglycemia      Patient seen and examined at bedside.  Overall medically stable, due to dinnertime hyperglycemia, I do suspect that she is not getting enough coverage at lunchtime secondary to her dietary habits.  Recommended additional 5 units with lunch in addition to sliding scale.  Otherwise closely monitor for signs symptoms of hypoglycemia.  Continue supportive care.       Reviewed and approved by ROBERT CRUM on 3/19/25 at 11:16 PM.     Reviewed and approved by ROBERT CRUM on 3/19/25 at 11:16 PM.       Electronically Signed By: Robert Crum DO   3/19/25 11:16 PM

## 2025-03-20 NOTE — PROGRESS NOTES
Subjective   Patient ID: Joana Morales is a 84 y.o. female.    Patient seen and examined on routine evaluation.  She regards that she is overall doing well, and states that she is tolerating all her medications.  Endorses that she does intermittently get high blood sugars especially around dinnertime.  This is due to to her snacking throughout the day especially after lunch.  Otherwise, endorses no additional issues or concerns and overall feels well.         Review of Systems   Constitutional:  Positive for fatigue.   HENT: Negative.     Respiratory: Negative.     Cardiovascular: Negative.    Gastrointestinal: Negative.    Musculoskeletal: Negative.    Neurological:  Positive for weakness.   Psychiatric/Behavioral: Negative.         Objective Vitals Reviewed via facility EMR   Physical Exam  Constitutional:       General: She is not in acute distress.     Appearance: She is not ill-appearing.   Eyes:      Pupils: Pupils are equal, round, and reactive to light.   Cardiovascular:      Rate and Rhythm: Normal rate and regular rhythm.      Pulses: Normal pulses.      Heart sounds: No murmur heard.  Pulmonary:      Effort: No respiratory distress.      Breath sounds: No wheezing.   Abdominal:      General: Abdomen is flat. Bowel sounds are normal. There is no distension.   Musculoskeletal:      Right lower leg: No edema.      Left lower leg: No edema.   Skin:     General: Skin is warm and dry.   Neurological:      Mental Status: She is alert. Mental status is at baseline.      Cranial Nerves: No cranial nerve deficit.      Motor: Weakness present.   Psychiatric:         Mood and Affect: Mood normal.         Behavior: Behavior normal.         Assessment/Plan   Diagnoses and all orders for this visit:  Chronic combined systolic and diastolic congestive heart failure  Hyperlipidemia, unspecified hyperlipidemia type  Type 2 diabetes mellitus with hyperglycemia, with long-term current use of insulin  HYUN (generalized anxiety  disorder)  Chronic obstructive pulmonary disease, unspecified COPD type (Multi)  Weakness  Hyperglycemia      Patient seen and examined at bedside.  Overall medically stable, due to dinnertime hyperglycemia, I do suspect that she is not getting enough coverage at lunchtime secondary to her dietary habits.  Recommended additional 5 units with lunch in addition to sliding scale.  Otherwise closely monitor for signs symptoms of hypoglycemia.  Continue supportive care.       Reviewed and approved by DOMINGO CRUM on 3/19/25 at 11:16 PM.     Reviewed and approved by DOMINGO CRUM on 3/19/25 at 11:16 PM.    declines

## 2025-04-18 ENCOUNTER — NURSING HOME VISIT (OUTPATIENT)
Dept: POST ACUTE CARE | Facility: EXTERNAL LOCATION | Age: 85
End: 2025-04-18
Payer: MEDICARE

## 2025-04-18 DIAGNOSIS — I48.91 ATRIAL FIBRILLATION, UNSPECIFIED TYPE (MULTI): Primary | ICD-10-CM

## 2025-04-18 DIAGNOSIS — I10 HYPERTENSION, ESSENTIAL: ICD-10-CM

## 2025-04-18 DIAGNOSIS — R53.1 WEAKNESS: ICD-10-CM

## 2025-04-18 DIAGNOSIS — E11.3292 TYPE 2 DIABETES MELLITUS WITH MILD NONPROLIFERATIVE RETINOPATHY OF LEFT EYE WITHOUT MACULAR EDEMA, UNSPECIFIED WHETHER LONG TERM INSULIN USE: ICD-10-CM

## 2025-04-18 DIAGNOSIS — J44.9 CHRONIC OBSTRUCTIVE PULMONARY DISEASE, UNSPECIFIED COPD TYPE (MULTI): ICD-10-CM

## 2025-04-18 ASSESSMENT — ENCOUNTER SYMPTOMS
CONSTITUTIONAL NEGATIVE: 1
NEUROLOGICAL NEGATIVE: 1
CARDIOVASCULAR NEGATIVE: 1
MUSCULOSKELETAL NEGATIVE: 1
GASTROINTESTINAL NEGATIVE: 1
PSYCHIATRIC NEGATIVE: 1
RESPIRATORY NEGATIVE: 1

## 2025-04-18 NOTE — PROGRESS NOTES
Subjective   Patient ID: Joana Morales is a 84 y.o. female.    Patient seen and examined on routine evaluation.  She states that she is overall doing well without any acute issues or concerns.  Blood sugars have been much better controlled, and she is tolerating her medications in the facility.  No recent falls as of late, endorses no acute complaints or concerns at this time.  Otherwise overall feels well without any additional issues.         Review of Systems   Constitutional: Negative.    HENT: Negative.     Respiratory: Negative.     Cardiovascular: Negative.    Gastrointestinal: Negative.    Musculoskeletal: Negative.    Neurological: Negative.    Psychiatric/Behavioral: Negative.         Objective Vitals Reviewed via facility EMR   Physical Exam  Constitutional:       General: She is not in acute distress.     Appearance: She is not ill-appearing.   Eyes:      Pupils: Pupils are equal, round, and reactive to light.   Cardiovascular:      Rate and Rhythm: Normal rate and regular rhythm.      Pulses: Normal pulses.      Heart sounds: No murmur heard.  Pulmonary:      Effort: No respiratory distress.      Breath sounds: No wheezing.   Abdominal:      General: Abdomen is flat. Bowel sounds are normal. There is no distension.   Musculoskeletal:      Right lower leg: No edema.      Left lower leg: No edema.   Skin:     General: Skin is warm and dry.   Neurological:      Mental Status: She is alert. Mental status is at baseline.      Cranial Nerves: No cranial nerve deficit.      Motor: No weakness.   Psychiatric:         Mood and Affect: Mood normal.         Behavior: Behavior normal.         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  Overall medically stable, continue optimization of activities of daily living.  Intermittently is having high blood sugars however.  Have been mostly stable except for couple values..  Otherwise no additional issues or concerns at this time.     Reviewed and approved by DOMINGO CRUM on 4/18/25 at 3:47 PM.

## 2025-04-18 NOTE — LETTER
Patient: Joana Morales  : 1940    Encounter Date: 2025    Subjective  Patient ID: Joana Morales is a 84 y.o. female.    Patient seen and examined on routine evaluation.  She states that she is overall doing well without any acute issues or concerns.  Blood sugars have been much better controlled, and she is tolerating her medications in the facility.  No recent falls as of late, endorses no acute complaints or concerns at this time.  Otherwise overall feels well without any additional issues.         Review of Systems   Constitutional: Negative.    HENT: Negative.     Respiratory: Negative.     Cardiovascular: Negative.    Gastrointestinal: Negative.    Musculoskeletal: Negative.    Neurological: Negative.    Psychiatric/Behavioral: Negative.         ObjectiveVitals Reviewed via facility EMR   Physical Exam  Constitutional:       General: She is not in acute distress.     Appearance: She is not ill-appearing.   Eyes:      Pupils: Pupils are equal, round, and reactive to light.   Cardiovascular:      Rate and Rhythm: Normal rate and regular rhythm.      Pulses: Normal pulses.      Heart sounds: No murmur heard.  Pulmonary:      Effort: No respiratory distress.      Breath sounds: No wheezing.   Abdominal:      General: Abdomen is flat. Bowel sounds are normal. There is no distension.   Musculoskeletal:      Right lower leg: No edema.      Left lower leg: No edema.   Skin:     General: Skin is warm and dry.   Neurological:      Mental Status: She is alert. Mental status is at baseline.      Cranial Nerves: No cranial nerve deficit.      Motor: No weakness.   Psychiatric:         Mood and Affect: Mood normal.         Behavior: Behavior normal.         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  Overall medically stable, continue optimization of activities of daily living.  Intermittently is having high blood sugars however.  Have been mostly stable except for couple values..  Otherwise no additional issues or concerns at this time.     Reviewed and approved by ROBERT CRUM on 4/18/25 at 3:47 PM.         Electronically Signed By: Robert Crum DO   4/18/25  3:47 PM

## 2025-05-23 ENCOUNTER — NURSING HOME VISIT (OUTPATIENT)
Dept: POST ACUTE CARE | Facility: EXTERNAL LOCATION | Age: 85
End: 2025-05-23
Payer: MEDICARE

## 2025-05-23 DIAGNOSIS — M17.0 OSTEOARTHRITIS OF BOTH KNEES, UNSPECIFIED OSTEOARTHRITIS TYPE: ICD-10-CM

## 2025-05-23 DIAGNOSIS — I50.42 CHRONIC COMBINED SYSTOLIC AND DIASTOLIC CONGESTIVE HEART FAILURE: ICD-10-CM

## 2025-05-23 DIAGNOSIS — Z79.4 TYPE 2 DIABETES MELLITUS WITH HYPERGLYCEMIA, WITH LONG-TERM CURRENT USE OF INSULIN: ICD-10-CM

## 2025-05-23 DIAGNOSIS — I48.91 ATRIAL FIBRILLATION, UNSPECIFIED TYPE (MULTI): Primary | ICD-10-CM

## 2025-05-23 DIAGNOSIS — E11.65 TYPE 2 DIABETES MELLITUS WITH HYPERGLYCEMIA, WITH LONG-TERM CURRENT USE OF INSULIN: ICD-10-CM

## 2025-05-23 DIAGNOSIS — R53.1 WEAKNESS: ICD-10-CM

## 2025-05-23 ASSESSMENT — ENCOUNTER SYMPTOMS
WEAKNESS: 1
PSYCHIATRIC NEGATIVE: 1
RESPIRATORY NEGATIVE: 1
CARDIOVASCULAR NEGATIVE: 1
MUSCULOSKELETAL NEGATIVE: 1
GASTROINTESTINAL NEGATIVE: 1
FATIGUE: 1

## 2025-05-23 NOTE — LETTER
Patient: Joana Morales  : 1940    Encounter Date: 2025    Subjective  Patient ID: Joana Morales is a 84 y.o. female.    Patient seen and examined on routine evaluation.  She regards that she is overall doing well without any acute issues or concerns.  Blood sugars have overall been stable, and is active within the facility.  Facility regards no acute issues or complaints and overall doing well as well.  States no additional issues.         Review of Systems   Constitutional:  Positive for fatigue.   HENT: Negative.     Respiratory: Negative.     Cardiovascular: Negative.    Gastrointestinal: Negative.    Musculoskeletal: Negative.    Neurological:  Positive for weakness.   Psychiatric/Behavioral: Negative.         ObjectiveVitals Reviewed via facility EMR   Physical Exam  Constitutional:       General: She is not in acute distress.     Appearance: She is not ill-appearing.   Eyes:      Pupils: Pupils are equal, round, and reactive to light.   Cardiovascular:      Rate and Rhythm: Normal rate and regular rhythm.      Pulses: Normal pulses.      Heart sounds: No murmur heard.  Pulmonary:      Effort: No respiratory distress.      Breath sounds: No wheezing.   Abdominal:      General: Abdomen is flat. Bowel sounds are normal. There is no distension.   Musculoskeletal:      Right lower leg: No edema.      Left lower leg: No edema.   Skin:     General: Skin is warm and dry.   Neurological:      Mental Status: She is alert. Mental status is at baseline.      Cranial Nerves: No cranial nerve deficit.      Motor: No weakness.   Psychiatric:         Mood and Affect: Mood normal.         Behavior: Behavior normal.         Assessment/Plan  Diagnoses and all orders for this visit:  Atrial fibrillation, unspecified type (Multi)  Chronic combined systolic and diastolic congestive heart failure  Type 2 diabetes mellitus with hyperglycemia, with long-term current use of insulin  Weakness  Osteoarthritis of both knees,  unspecified osteoarthritis type    Patient seen and examined at bedside.  Overall medically stable continue optimization of activities of daily living.  Labs and vitals and blood sugars reviewed and no issues noted.  Continue supportive care.  Staff updated with care plan and discussed care plan and no acute issues noted.  Blood sugars are typically within the 130s to 180s which is appropriate for her multiple medical diagnosis and level of functioning.  Continue supportive care.        Reviewed and approved by ROBERT CRUM on 5/23/25 at 7:22 PM.       Electronically Signed By: Robert Crum DO   5/23/25  7:22 PM

## 2025-05-23 NOTE — PROGRESS NOTES
Subjective   Patient ID: Joana Morales is a 84 y.o. female.    Patient seen and examined on routine evaluation.  She regards that she is overall doing well without any acute issues or concerns.  Blood sugars have overall been stable, and is active within the facility.  Facility regards no acute issues or complaints and overall doing well as well.  States no additional issues.         Review of Systems   Constitutional:  Positive for fatigue.   HENT: Negative.     Respiratory: Negative.     Cardiovascular: Negative.    Gastrointestinal: Negative.    Musculoskeletal: Negative.    Neurological:  Positive for weakness.   Psychiatric/Behavioral: Negative.         Objective Vitals Reviewed via facility EMR   Physical Exam  Constitutional:       General: She is not in acute distress.     Appearance: She is not ill-appearing.   Eyes:      Pupils: Pupils are equal, round, and reactive to light.   Cardiovascular:      Rate and Rhythm: Normal rate and regular rhythm.      Pulses: Normal pulses.      Heart sounds: No murmur heard.  Pulmonary:      Effort: No respiratory distress.      Breath sounds: No wheezing.   Abdominal:      General: Abdomen is flat. Bowel sounds are normal. There is no distension.   Musculoskeletal:      Right lower leg: No edema.      Left lower leg: No edema.   Skin:     General: Skin is warm and dry.   Neurological:      Mental Status: She is alert. Mental status is at baseline.      Cranial Nerves: No cranial nerve deficit.      Motor: No weakness.   Psychiatric:         Mood and Affect: Mood normal.         Behavior: Behavior normal.         Assessment/Plan   Diagnoses and all orders for this visit:  Atrial fibrillation, unspecified type (Multi)  Chronic combined systolic and diastolic congestive heart failure  Type 2 diabetes mellitus with hyperglycemia, with long-term current use of insulin  Weakness  Osteoarthritis of both knees, unspecified osteoarthritis type    Patient seen and examined at  bedside.  Overall medically stable continue optimization of activities of daily living.  Labs and vitals and blood sugars reviewed and no issues noted.  Continue supportive care.  Staff updated with care plan and discussed care plan and no acute issues noted.  Blood sugars are typically within the 130s to 180s which is appropriate for her multiple medical diagnosis and level of functioning.  Continue supportive care.        Reviewed and approved by DOMINGO CRUM on 5/23/25 at 7:22 PM.

## 2025-07-01 ENCOUNTER — NURSING HOME VISIT (OUTPATIENT)
Dept: POST ACUTE CARE | Facility: EXTERNAL LOCATION | Age: 85
End: 2025-07-01
Payer: MEDICARE

## 2025-07-01 DIAGNOSIS — I10 HYPERTENSION, ESSENTIAL: ICD-10-CM

## 2025-07-01 DIAGNOSIS — F41.1 GAD (GENERALIZED ANXIETY DISORDER): ICD-10-CM

## 2025-07-01 DIAGNOSIS — E11.3292 TYPE 2 DIABETES MELLITUS WITH MILD NONPROLIFERATIVE RETINOPATHY OF LEFT EYE WITHOUT MACULAR EDEMA, UNSPECIFIED WHETHER LONG TERM INSULIN USE: ICD-10-CM

## 2025-07-01 DIAGNOSIS — E78.5 HYPERLIPIDEMIA, UNSPECIFIED HYPERLIPIDEMIA TYPE: Primary | ICD-10-CM

## 2025-07-01 PROCEDURE — 99308 SBSQ NF CARE LOW MDM 20: CPT | Performed by: STUDENT IN AN ORGANIZED HEALTH CARE EDUCATION/TRAINING PROGRAM

## 2025-07-01 NOTE — LETTER
Patient: Joana Morales  : 1940    Encounter Date: 2025    Subjective  Patient ID: Joana Morales is a 84 y.o. female.    Patient seen on routine evaluation.  She regards that she is overall doing well, and is doing well at the facility.  States no acute issues or concerns and no issues with her medications blood sugars do seem to be well-controlled.  Otherwise, staff regards no acute issues or concerns.         Review of Systems   Constitutional:  Positive for fatigue.   HENT: Negative.     Respiratory: Negative.     Cardiovascular: Negative.    Gastrointestinal: Negative.    Musculoskeletal: Negative.    Neurological:  Positive for weakness.   Psychiatric/Behavioral: Negative.         ObjectiveVitals Reviewed via facility EMR   Physical Exam  Constitutional:       General: She is not in acute distress.     Appearance: She is not ill-appearing.   Eyes:      Pupils: Pupils are equal, round, and reactive to light.   Cardiovascular:      Rate and Rhythm: Normal rate and regular rhythm.      Pulses: Normal pulses.      Heart sounds: No murmur heard.  Pulmonary:      Effort: No respiratory distress.      Breath sounds: No wheezing.   Abdominal:      General: Abdomen is flat. Bowel sounds are normal. There is no distension.   Musculoskeletal:      Right lower leg: No edema.      Left lower leg: No edema.   Skin:     General: Skin is warm and dry.   Neurological:      Mental Status: She is alert. Mental status is at baseline.      Cranial Nerves: No cranial nerve deficit.      Motor: Weakness present.   Psychiatric:         Mood and Affect: Mood normal.         Behavior: Behavior normal.         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  Overall medically stable, recent labs and vitals reviewed.  No change in medications at this time continue optimization with activities of daily living.  Otherwise, no additional issues or concerns at this time.     Reviewed and approved by ROBERT CRUM on 7/2/25 at 10:51 PM.     Electronically Signed By: Robert Crum DO   7/2/25 10:51 PM

## 2025-07-02 ASSESSMENT — ENCOUNTER SYMPTOMS
FATIGUE: 1
PSYCHIATRIC NEGATIVE: 1
RESPIRATORY NEGATIVE: 1
WEAKNESS: 1
MUSCULOSKELETAL NEGATIVE: 1
CARDIOVASCULAR NEGATIVE: 1
GASTROINTESTINAL NEGATIVE: 1

## 2025-07-03 NOTE — PROGRESS NOTES
Subjective   Patient ID: Joana Morales is a 84 y.o. female.    Patient seen on routine evaluation.  She regards that she is overall doing well, and is doing well at the facility.  States no acute issues or concerns and no issues with her medications blood sugars do seem to be well-controlled.  Otherwise, staff regards no acute issues or concerns.         Review of Systems   Constitutional:  Positive for fatigue.   HENT: Negative.     Respiratory: Negative.     Cardiovascular: Negative.    Gastrointestinal: Negative.    Musculoskeletal: Negative.    Neurological:  Positive for weakness.   Psychiatric/Behavioral: Negative.         Objective Vitals Reviewed via facility EMR   Physical Exam  Constitutional:       General: She is not in acute distress.     Appearance: She is not ill-appearing.   Eyes:      Pupils: Pupils are equal, round, and reactive to light.   Cardiovascular:      Rate and Rhythm: Normal rate and regular rhythm.      Pulses: Normal pulses.      Heart sounds: No murmur heard.  Pulmonary:      Effort: No respiratory distress.      Breath sounds: No wheezing.   Abdominal:      General: Abdomen is flat. Bowel sounds are normal. There is no distension.   Musculoskeletal:      Right lower leg: No edema.      Left lower leg: No edema.   Skin:     General: Skin is warm and dry.   Neurological:      Mental Status: She is alert. Mental status is at baseline.      Cranial Nerves: No cranial nerve deficit.      Motor: Weakness present.   Psychiatric:         Mood and Affect: Mood normal.         Behavior: Behavior normal.         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  Overall medically stable, recent  labs and vitals reviewed.  No change in medications at this time continue optimization with activities of daily living.  Otherwise, no additional issues or concerns at this time.     Reviewed and approved by DOMINGO CRUM on 7/2/25 at 10:51 PM.

## 2025-08-15 ENCOUNTER — NURSING HOME VISIT (OUTPATIENT)
Dept: POST ACUTE CARE | Facility: EXTERNAL LOCATION | Age: 85
End: 2025-08-15
Payer: MEDICARE

## 2025-08-15 DIAGNOSIS — E11.65 TYPE 2 DIABETES MELLITUS WITH HYPERGLYCEMIA, WITH LONG-TERM CURRENT USE OF INSULIN: Primary | ICD-10-CM

## 2025-08-15 DIAGNOSIS — E78.5 HYPERLIPIDEMIA, UNSPECIFIED HYPERLIPIDEMIA TYPE: ICD-10-CM

## 2025-08-15 DIAGNOSIS — Z79.4 TYPE 2 DIABETES MELLITUS WITH HYPERGLYCEMIA, WITH LONG-TERM CURRENT USE OF INSULIN: Primary | ICD-10-CM

## 2025-08-15 DIAGNOSIS — J44.9 CHRONIC OBSTRUCTIVE PULMONARY DISEASE, UNSPECIFIED COPD TYPE (MULTI): ICD-10-CM

## 2025-08-15 DIAGNOSIS — I10 HYPERTENSION, ESSENTIAL: ICD-10-CM

## 2025-08-15 DIAGNOSIS — I48.91 ATRIAL FIBRILLATION, UNSPECIFIED TYPE (MULTI): ICD-10-CM

## 2025-08-15 DIAGNOSIS — R53.1 WEAKNESS: ICD-10-CM

## 2025-08-16 ASSESSMENT — ENCOUNTER SYMPTOMS
RESPIRATORY NEGATIVE: 1
WEAKNESS: 1
FATIGUE: 1
MUSCULOSKELETAL NEGATIVE: 1
CARDIOVASCULAR NEGATIVE: 1
PSYCHIATRIC NEGATIVE: 1
GASTROINTESTINAL NEGATIVE: 1